# Patient Record
Sex: FEMALE | Race: WHITE | NOT HISPANIC OR LATINO | Employment: UNEMPLOYED | URBAN - METROPOLITAN AREA
[De-identification: names, ages, dates, MRNs, and addresses within clinical notes are randomized per-mention and may not be internally consistent; named-entity substitution may affect disease eponyms.]

---

## 2018-01-01 ENCOUNTER — LAB REQUISITION (OUTPATIENT)
Dept: LAB | Facility: HOSPITAL | Age: 83
End: 2018-01-01

## 2018-01-01 ENCOUNTER — APPOINTMENT (OUTPATIENT)
Dept: GENERAL RADIOLOGY | Facility: HOSPITAL | Age: 83
End: 2018-01-01
Attending: INTERNAL MEDICINE

## 2018-01-01 ENCOUNTER — HOSPITAL ENCOUNTER (INPATIENT)
Facility: HOSPITAL | Age: 83
LOS: 2 days | End: 2018-09-26
Attending: INTERNAL MEDICINE | Admitting: INTERNAL MEDICINE

## 2018-01-01 ENCOUNTER — APPOINTMENT (OUTPATIENT)
Dept: GENERAL RADIOLOGY | Facility: HOSPITAL | Age: 83
End: 2018-01-01

## 2018-01-01 ENCOUNTER — HOSPITAL ENCOUNTER (INPATIENT)
Facility: HOSPITAL | Age: 83
LOS: 8 days | Discharge: HOSPICE | End: 2018-09-24
Attending: EMERGENCY MEDICINE | Admitting: INTERNAL MEDICINE

## 2018-01-01 ENCOUNTER — APPOINTMENT (OUTPATIENT)
Dept: ULTRASOUND IMAGING | Facility: HOSPITAL | Age: 83
End: 2018-01-01

## 2018-01-01 VITALS
BODY MASS INDEX: 21.34 KG/M2 | HEART RATE: 82 BPM | SYSTOLIC BLOOD PRESSURE: 141 MMHG | HEIGHT: 64 IN | OXYGEN SATURATION: 91 % | RESPIRATION RATE: 18 BRPM | TEMPERATURE: 97.8 F | DIASTOLIC BLOOD PRESSURE: 82 MMHG | WEIGHT: 125 LBS

## 2018-01-01 VITALS — TEMPERATURE: 97.4 F | DIASTOLIC BLOOD PRESSURE: 66 MMHG | SYSTOLIC BLOOD PRESSURE: 109 MMHG | OXYGEN SATURATION: 65 %

## 2018-01-01 DIAGNOSIS — A41.9 SEPTIC SHOCK (HCC): ICD-10-CM

## 2018-01-01 DIAGNOSIS — Z00.00 ROUTINE GENERAL MEDICAL EXAMINATION AT A HEALTH CARE FACILITY: ICD-10-CM

## 2018-01-01 DIAGNOSIS — E87.0 HYPERNATREMIA: ICD-10-CM

## 2018-01-01 DIAGNOSIS — N17.9 ACUTE RENAL FAILURE, UNSPECIFIED ACUTE RENAL FAILURE TYPE (HCC): ICD-10-CM

## 2018-01-01 DIAGNOSIS — A41.9 SEPSIS, DUE TO UNSPECIFIED ORGANISM: Primary | ICD-10-CM

## 2018-01-01 DIAGNOSIS — D72.829 LEUKOCYTOSIS, UNSPECIFIED TYPE: ICD-10-CM

## 2018-01-01 DIAGNOSIS — E86.0 DEHYDRATION: ICD-10-CM

## 2018-01-01 DIAGNOSIS — R13.12 OROPHARYNGEAL DYSPHAGIA: ICD-10-CM

## 2018-01-01 DIAGNOSIS — R65.21 SEPTIC SHOCK (HCC): ICD-10-CM

## 2018-01-01 LAB
ALBUMIN SERPL-MCNC: 2.3 G/DL (ref 3.5–5.2)
ALBUMIN SERPL-MCNC: 2.5 G/DL (ref 3.5–5.2)
ALBUMIN SERPL-MCNC: 2.8 G/DL (ref 3.5–5.2)
ALBUMIN/GLOB SERPL: 0.7 G/DL
ALP SERPL-CCNC: 74 U/L (ref 39–117)
ALT SERPL W P-5'-P-CCNC: 22 U/L (ref 1–33)
ANION GAP SERPL CALCULATED.3IONS-SCNC: 12.8 MMOL/L
ANION GAP SERPL CALCULATED.3IONS-SCNC: 14.9 MMOL/L
ANION GAP SERPL CALCULATED.3IONS-SCNC: 17.5 MMOL/L
ANION GAP SERPL CALCULATED.3IONS-SCNC: 7.7 MMOL/L
ANION GAP SERPL CALCULATED.3IONS-SCNC: 9.9 MMOL/L
ANION GAP SERPL CALCULATED.3IONS-SCNC: 9.9 MMOL/L
APTT PPP: 38.8 SECONDS (ref 22.7–35.4)
AST SERPL-CCNC: 26 U/L (ref 1–32)
BACTERIA SPEC AEROBE CULT: NORMAL
BACTERIA SPEC AEROBE CULT: NORMAL
BACTERIA UR QL AUTO: ABNORMAL /HPF
BASOPHILS # BLD AUTO: 0.01 10*3/MM3 (ref 0–0.2)
BASOPHILS # BLD AUTO: 0.01 10*3/MM3 (ref 0–0.2)
BASOPHILS # BLD AUTO: 0.02 10*3/MM3 (ref 0–0.2)
BASOPHILS # BLD AUTO: 0.03 10*3/MM3 (ref 0–0.2)
BASOPHILS # BLD AUTO: 0.04 10*3/MM3 (ref 0–0.2)
BASOPHILS # BLD AUTO: 0.04 10*3/MM3 (ref 0–0.2)
BASOPHILS NFR BLD AUTO: 0.1 % (ref 0–1.5)
BASOPHILS NFR BLD AUTO: 0.2 % (ref 0–1.5)
BILIRUB SERPL-MCNC: 0.5 MG/DL (ref 0.1–1.2)
BILIRUB UR QL STRIP: NEGATIVE
BUN BLD-MCNC: 23 MG/DL (ref 8–23)
BUN BLD-MCNC: 35 MG/DL (ref 8–23)
BUN BLD-MCNC: 51 MG/DL (ref 8–23)
BUN BLD-MCNC: 70 MG/DL (ref 8–23)
BUN BLD-MCNC: 80 MG/DL (ref 8–23)
BUN BLD-MCNC: 85 MG/DL (ref 8–23)
BUN/CREAT SERPL: 22.3 (ref 7–25)
BUN/CREAT SERPL: 32.4 (ref 7–25)
BUN/CREAT SERPL: 32.7 (ref 7–25)
BUN/CREAT SERPL: 33.5 (ref 7–25)
BUN/CREAT SERPL: 36.5 (ref 7–25)
BUN/CREAT SERPL: 37.2 (ref 7–25)
CA-I BLD-MCNC: 4.2 MG/DL (ref 4.6–5.4)
CA-I SERPL ISE-MCNC: 1.04 MMOL/L (ref 1.15–1.35)
CALCIUM SPEC-SCNC: 6.7 MG/DL (ref 8.6–10.5)
CALCIUM SPEC-SCNC: 6.8 MG/DL (ref 8.6–10.5)
CALCIUM SPEC-SCNC: 6.8 MG/DL (ref 8.6–10.5)
CALCIUM SPEC-SCNC: 8.3 MG/DL (ref 8.6–10.5)
CALCIUM SPEC-SCNC: 8.4 MG/DL (ref 8.6–10.5)
CALCIUM SPEC-SCNC: 8.8 MG/DL (ref 8.6–10.5)
CHLORIDE SERPL-SCNC: 123 MMOL/L (ref 98–107)
CHLORIDE SERPL-SCNC: 125 MMOL/L (ref 98–107)
CHLORIDE SERPL-SCNC: 125 MMOL/L (ref 98–107)
CHLORIDE SERPL-SCNC: 129 MMOL/L (ref 98–107)
CHLORIDE SERPL-SCNC: 131 MMOL/L (ref 98–107)
CHLORIDE SERPL-SCNC: 135 MMOL/L (ref 98–107)
CLARITY UR: ABNORMAL
CO2 SERPL-SCNC: 21.1 MMOL/L (ref 22–29)
CO2 SERPL-SCNC: 22.1 MMOL/L (ref 22–29)
CO2 SERPL-SCNC: 22.2 MMOL/L (ref 22–29)
CO2 SERPL-SCNC: 22.3 MMOL/L (ref 22–29)
CO2 SERPL-SCNC: 28.1 MMOL/L (ref 22–29)
CO2 SERPL-SCNC: 30.5 MMOL/L (ref 22–29)
COLOR UR: ABNORMAL
CREAT BLD-MCNC: 0.96 MG/DL (ref 0.57–1)
CREAT BLD-MCNC: 1.03 MG/DL (ref 0.57–1)
CREAT BLD-MCNC: 1.37 MG/DL (ref 0.57–1)
CREAT BLD-MCNC: 2.09 MG/DL (ref 0.57–1)
CREAT BLD-MCNC: 2.47 MG/DL (ref 0.57–1)
CREAT BLD-MCNC: 2.6 MG/DL (ref 0.57–1)
D-LACTATE SERPL-SCNC: 2 MMOL/L (ref 0.5–2)
DEPRECATED RDW RBC AUTO: 49.2 FL (ref 37–54)
DEPRECATED RDW RBC AUTO: 50.4 FL (ref 37–54)
DEPRECATED RDW RBC AUTO: 53.8 FL (ref 37–54)
DEPRECATED RDW RBC AUTO: 54.9 FL (ref 37–54)
DEPRECATED RDW RBC AUTO: 55.2 FL (ref 37–54)
DEPRECATED RDW RBC AUTO: 56.3 FL (ref 37–54)
EOSINOPHIL # BLD AUTO: 0 10*3/MM3 (ref 0–0.7)
EOSINOPHIL # BLD AUTO: 0.01 10*3/MM3 (ref 0–0.7)
EOSINOPHIL # BLD AUTO: 0.01 10*3/MM3 (ref 0–0.7)
EOSINOPHIL # BLD AUTO: 0.02 10*3/MM3 (ref 0–0.7)
EOSINOPHIL # BLD AUTO: 0.02 10*3/MM3 (ref 0–0.7)
EOSINOPHIL # BLD AUTO: 0.06 10*3/MM3 (ref 0–0.7)
EOSINOPHIL NFR BLD AUTO: 0 % (ref 0.3–6.2)
EOSINOPHIL NFR BLD AUTO: 0.1 % (ref 0.3–6.2)
EOSINOPHIL NFR BLD AUTO: 0.3 % (ref 0.3–6.2)
ERYTHROCYTE [DISTWIDTH] IN BLOOD BY AUTOMATED COUNT: 13.5 % (ref 11.7–13)
ERYTHROCYTE [DISTWIDTH] IN BLOOD BY AUTOMATED COUNT: 13.7 % (ref 11.7–13)
ERYTHROCYTE [DISTWIDTH] IN BLOOD BY AUTOMATED COUNT: 13.9 % (ref 11.7–13)
ERYTHROCYTE [DISTWIDTH] IN BLOOD BY AUTOMATED COUNT: 14.2 % (ref 11.7–13)
ERYTHROCYTE [DISTWIDTH] IN BLOOD BY AUTOMATED COUNT: 14.3 % (ref 11.7–13)
ERYTHROCYTE [DISTWIDTH] IN BLOOD BY AUTOMATED COUNT: 14.4 % (ref 11.7–13)
FINE GRAN CASTS URNS QL MICRO: ABNORMAL /LPF
GFR SERPL CREATININE-BSD FRML MDRD: 17 ML/MIN/1.73
GFR SERPL CREATININE-BSD FRML MDRD: 18 ML/MIN/1.73
GFR SERPL CREATININE-BSD FRML MDRD: 22 ML/MIN/1.73
GFR SERPL CREATININE-BSD FRML MDRD: 22 ML/MIN/1.73
GFR SERPL CREATININE-BSD FRML MDRD: 36 ML/MIN/1.73
GFR SERPL CREATININE-BSD FRML MDRD: 50 ML/MIN/1.73
GFR SERPL CREATININE-BSD FRML MDRD: 55 ML/MIN/1.73
GLOBULIN UR ELPH-MCNC: 4 GM/DL
GLUCOSE BLD-MCNC: 132 MG/DL (ref 65–99)
GLUCOSE BLD-MCNC: 155 MG/DL (ref 65–99)
GLUCOSE BLD-MCNC: 237 MG/DL (ref 65–99)
GLUCOSE BLD-MCNC: 73 MG/DL (ref 65–99)
GLUCOSE BLD-MCNC: 84 MG/DL (ref 65–99)
GLUCOSE BLD-MCNC: 93 MG/DL (ref 65–99)
GLUCOSE BLDC GLUCOMTR-MCNC: 103 MG/DL (ref 70–130)
GLUCOSE BLDC GLUCOMTR-MCNC: 108 MG/DL (ref 70–130)
GLUCOSE BLDC GLUCOMTR-MCNC: 109 MG/DL (ref 70–130)
GLUCOSE BLDC GLUCOMTR-MCNC: 122 MG/DL (ref 70–130)
GLUCOSE BLDC GLUCOMTR-MCNC: 130 MG/DL (ref 70–130)
GLUCOSE BLDC GLUCOMTR-MCNC: 136 MG/DL (ref 70–130)
GLUCOSE BLDC GLUCOMTR-MCNC: 137 MG/DL (ref 70–130)
GLUCOSE BLDC GLUCOMTR-MCNC: 141 MG/DL (ref 70–130)
GLUCOSE BLDC GLUCOMTR-MCNC: 143 MG/DL (ref 70–130)
GLUCOSE BLDC GLUCOMTR-MCNC: 145 MG/DL (ref 70–130)
GLUCOSE BLDC GLUCOMTR-MCNC: 156 MG/DL (ref 70–130)
GLUCOSE BLDC GLUCOMTR-MCNC: 159 MG/DL (ref 70–130)
GLUCOSE BLDC GLUCOMTR-MCNC: 192 MG/DL (ref 70–130)
GLUCOSE BLDC GLUCOMTR-MCNC: 200 MG/DL (ref 70–130)
GLUCOSE BLDC GLUCOMTR-MCNC: 223 MG/DL (ref 70–130)
GLUCOSE BLDC GLUCOMTR-MCNC: 224 MG/DL (ref 70–130)
GLUCOSE BLDC GLUCOMTR-MCNC: 82 MG/DL (ref 70–130)
GLUCOSE BLDC GLUCOMTR-MCNC: 90 MG/DL (ref 70–130)
GLUCOSE BLDC GLUCOMTR-MCNC: 92 MG/DL (ref 70–130)
GLUCOSE UR STRIP-MCNC: NEGATIVE MG/DL
HCT VFR BLD AUTO: 35.4 % (ref 35.6–45.5)
HCT VFR BLD AUTO: 36.2 % (ref 35.6–45.5)
HCT VFR BLD AUTO: 37 % (ref 35.6–45.5)
HCT VFR BLD AUTO: 40.9 % (ref 35.6–45.5)
HCT VFR BLD AUTO: 42.5 % (ref 35.6–45.5)
HCT VFR BLD AUTO: 46.5 % (ref 35.6–45.5)
HGB BLD-MCNC: 10.3 G/DL (ref 11.9–15.5)
HGB BLD-MCNC: 10.7 G/DL (ref 11.9–15.5)
HGB BLD-MCNC: 11.1 G/DL (ref 11.9–15.5)
HGB BLD-MCNC: 11.3 G/DL (ref 11.9–15.5)
HGB BLD-MCNC: 12.6 G/DL (ref 11.9–15.5)
HGB BLD-MCNC: 13.3 G/DL (ref 11.9–15.5)
HGB UR QL STRIP.AUTO: ABNORMAL
HOLD SPECIMEN: NORMAL
HOLD SPECIMEN: NORMAL
HYALINE CASTS UR QL AUTO: ABNORMAL /LPF
IMM GRANULOCYTES # BLD: 0.03 10*3/MM3 (ref 0–0.03)
IMM GRANULOCYTES # BLD: 0.04 10*3/MM3 (ref 0–0.03)
IMM GRANULOCYTES # BLD: 0.05 10*3/MM3 (ref 0–0.03)
IMM GRANULOCYTES # BLD: 0.05 10*3/MM3 (ref 0–0.03)
IMM GRANULOCYTES # BLD: 0.06 10*3/MM3 (ref 0–0.03)
IMM GRANULOCYTES # BLD: 0.09 10*3/MM3 (ref 0–0.03)
IMM GRANULOCYTES NFR BLD: 0.3 % (ref 0–0.5)
IMM GRANULOCYTES NFR BLD: 0.5 % (ref 0–0.5)
INR PPP: 1.86 (ref 0.9–1.1)
KETONES UR QL STRIP: ABNORMAL
LEUKOCYTE ESTERASE UR QL STRIP.AUTO: ABNORMAL
LYMPHOCYTES # BLD AUTO: 0.46 10*3/MM3 (ref 0.9–4.8)
LYMPHOCYTES # BLD AUTO: 0.54 10*3/MM3 (ref 0.9–4.8)
LYMPHOCYTES # BLD AUTO: 1.23 10*3/MM3 (ref 0.9–4.8)
LYMPHOCYTES # BLD AUTO: 1.27 10*3/MM3 (ref 0.9–4.8)
LYMPHOCYTES # BLD AUTO: 1.89 10*3/MM3 (ref 0.9–4.8)
LYMPHOCYTES # BLD AUTO: 2.25 10*3/MM3 (ref 0.9–4.8)
LYMPHOCYTES NFR BLD AUTO: 11.4 % (ref 19.6–45.3)
LYMPHOCYTES NFR BLD AUTO: 4.3 % (ref 19.6–45.3)
LYMPHOCYTES NFR BLD AUTO: 4.6 % (ref 19.6–45.3)
LYMPHOCYTES NFR BLD AUTO: 6.6 % (ref 19.6–45.3)
LYMPHOCYTES NFR BLD AUTO: 6.8 % (ref 19.6–45.3)
LYMPHOCYTES NFR BLD AUTO: 9.7 % (ref 19.6–45.3)
MAGNESIUM SERPL-MCNC: 2.3 MG/DL (ref 1.6–2.4)
MAGNESIUM SERPL-MCNC: 2.3 MG/DL (ref 1.6–2.4)
MCH RBC QN AUTO: 30 PG (ref 26.9–32)
MCH RBC QN AUTO: 30.1 PG (ref 26.9–32)
MCH RBC QN AUTO: 30.3 PG (ref 26.9–32)
MCH RBC QN AUTO: 30.6 PG (ref 26.9–32)
MCH RBC QN AUTO: 30.9 PG (ref 26.9–32)
MCH RBC QN AUTO: 31.5 PG (ref 26.9–32)
MCHC RBC AUTO-ENTMCNC: 27.6 G/DL (ref 32.4–36.3)
MCHC RBC AUTO-ENTMCNC: 28.6 G/DL (ref 32.4–36.3)
MCHC RBC AUTO-ENTMCNC: 29.1 G/DL (ref 32.4–36.3)
MCHC RBC AUTO-ENTMCNC: 29.6 G/DL (ref 32.4–36.3)
MCHC RBC AUTO-ENTMCNC: 29.6 G/DL (ref 32.4–36.3)
MCHC RBC AUTO-ENTMCNC: 30 G/DL (ref 32.4–36.3)
MCV RBC AUTO: 101.1 FL (ref 80.5–98.2)
MCV RBC AUTO: 101.7 FL (ref 80.5–98.2)
MCV RBC AUTO: 106.3 FL (ref 80.5–98.2)
MCV RBC AUTO: 106.3 FL (ref 80.5–98.2)
MCV RBC AUTO: 107.1 FL (ref 80.5–98.2)
MCV RBC AUTO: 108.5 FL (ref 80.5–98.2)
MONOCYTES # BLD AUTO: 0.22 10*3/MM3 (ref 0.2–1.2)
MONOCYTES # BLD AUTO: 0.31 10*3/MM3 (ref 0.2–1.2)
MONOCYTES # BLD AUTO: 0.83 10*3/MM3 (ref 0.2–1.2)
MONOCYTES # BLD AUTO: 1.01 10*3/MM3 (ref 0.2–1.2)
MONOCYTES # BLD AUTO: 1.28 10*3/MM3 (ref 0.2–1.2)
MONOCYTES # BLD AUTO: 1.42 10*3/MM3 (ref 0.2–1.2)
MONOCYTES NFR BLD AUTO: 2.1 % (ref 5–12)
MONOCYTES NFR BLD AUTO: 2.6 % (ref 5–12)
MONOCYTES NFR BLD AUTO: 4.3 % (ref 5–12)
MONOCYTES NFR BLD AUTO: 5.6 % (ref 5–12)
MONOCYTES NFR BLD AUTO: 6.6 % (ref 5–12)
MONOCYTES NFR BLD AUTO: 7.2 % (ref 5–12)
MRSA SPEC QL CULT: NORMAL
NEUTROPHILS # BLD AUTO: 10 10*3/MM3 (ref 1.9–8.1)
NEUTROPHILS # BLD AUTO: 10.93 10*3/MM3 (ref 1.9–8.1)
NEUTROPHILS # BLD AUTO: 15.75 10*3/MM3 (ref 1.9–8.1)
NEUTROPHILS # BLD AUTO: 16.08 10*3/MM3 (ref 1.9–8.1)
NEUTROPHILS # BLD AUTO: 16.14 10*3/MM3 (ref 1.9–8.1)
NEUTROPHILS # BLD AUTO: 17.13 10*3/MM3 (ref 1.9–8.1)
NEUTROPHILS NFR BLD AUTO: 81.1 % (ref 42.7–76)
NEUTROPHILS NFR BLD AUTO: 83.1 % (ref 42.7–76)
NEUTROPHILS NFR BLD AUTO: 87.2 % (ref 42.7–76)
NEUTROPHILS NFR BLD AUTO: 88.5 % (ref 42.7–76)
NEUTROPHILS NFR BLD AUTO: 92.4 % (ref 42.7–76)
NEUTROPHILS NFR BLD AUTO: 93.1 % (ref 42.7–76)
NITRITE UR QL STRIP: NEGATIVE
NRBC BLD MANUAL-RTO: 0 /100 WBC (ref 0–0)
NT-PROBNP SERPL-MCNC: 4442 PG/ML (ref 0–1800)
OSMOLALITY UR: 603 MOSM/KG
PH UR STRIP.AUTO: <=5 [PH] (ref 5–8)
PHOSPHATE SERPL-MCNC: 1.9 MG/DL (ref 2.5–4.5)
PHOSPHATE SERPL-MCNC: 2.4 MG/DL (ref 2.5–4.5)
PHOSPHATE SERPL-MCNC: 3.1 MG/DL (ref 2.5–4.5)
PLAT MORPH BLD: NORMAL
PLATELET # BLD AUTO: 103 10*3/MM3 (ref 140–500)
PLATELET # BLD AUTO: 127 10*3/MM3 (ref 140–500)
PLATELET # BLD AUTO: 129 10*3/MM3 (ref 140–500)
PLATELET # BLD AUTO: 142 10*3/MM3 (ref 140–500)
PLATELET # BLD AUTO: 142 10*3/MM3 (ref 140–500)
PLATELET # BLD AUTO: 163 10*3/MM3 (ref 140–500)
PMV BLD AUTO: 13.6 FL (ref 6–12)
PMV BLD AUTO: 13.7 FL (ref 6–12)
PMV BLD AUTO: 13.8 FL (ref 6–12)
POTASSIUM BLD-SCNC: 3.3 MMOL/L (ref 3.5–5.2)
POTASSIUM BLD-SCNC: 3.4 MMOL/L (ref 3.5–5.2)
POTASSIUM BLD-SCNC: 3.4 MMOL/L (ref 3.5–5.2)
POTASSIUM BLD-SCNC: 4 MMOL/L (ref 3.5–5.2)
POTASSIUM BLD-SCNC: 4.5 MMOL/L (ref 3.5–5.2)
POTASSIUM BLD-SCNC: 4.8 MMOL/L (ref 3.5–5.2)
PROCALCITONIN SERPL-MCNC: 0.71 NG/ML (ref 0.1–0.25)
PROT SERPL-MCNC: 6.8 G/DL (ref 6–8.5)
PROT UR QL STRIP: ABNORMAL
PROTHROMBIN TIME: 21.1 SECONDS (ref 11.7–14.2)
RBC # BLD AUTO: 3.33 10*6/MM3 (ref 3.9–5.2)
RBC # BLD AUTO: 3.56 10*6/MM3 (ref 3.9–5.2)
RBC # BLD AUTO: 3.66 10*6/MM3 (ref 3.9–5.2)
RBC # BLD AUTO: 3.77 10*6/MM3 (ref 3.9–5.2)
RBC # BLD AUTO: 4 10*6/MM3 (ref 3.9–5.2)
RBC # BLD AUTO: 4.34 10*6/MM3 (ref 3.9–5.2)
RBC # UR: ABNORMAL /HPF
RBC MORPH BLD: NORMAL
REF LAB TEST METHOD: ABNORMAL
SODIUM BLD-SCNC: 154 MMOL/L (ref 136–145)
SODIUM BLD-SCNC: 155 MMOL/L (ref 136–145)
SODIUM BLD-SCNC: 156 MMOL/L (ref 136–145)
SODIUM BLD-SCNC: 161 MMOL/L (ref 136–145)
SODIUM BLD-SCNC: 163 MMOL/L (ref 136–145)
SODIUM BLD-SCNC: 167 MMOL/L (ref 136–145)
SODIUM BLD-SCNC: 170 MMOL/L (ref 136–145)
SODIUM BLD-SCNC: 170 MMOL/L (ref 136–145)
SODIUM BLD-SCNC: 172 MMOL/L (ref 136–145)
SODIUM BLD-SCNC: 173 MMOL/L (ref 136–145)
SP GR UR STRIP: 1.02 (ref 1–1.03)
SQUAMOUS #/AREA URNS HPF: ABNORMAL /HPF
TROPONIN T SERPL-MCNC: 0.1 NG/ML (ref 0–0.03)
TROPONIN T SERPL-MCNC: 0.11 NG/ML (ref 0–0.03)
TROPONIN T SERPL-MCNC: 0.11 NG/ML (ref 0–0.03)
TROPONIN T SERPL-MCNC: 0.12 NG/ML (ref 0–0.03)
UROBILINOGEN UR QL STRIP: ABNORMAL
VANCOMYCIN SERPL-MCNC: 16.6 MCG/ML (ref 5–40)
VANCOMYCIN SERPL-MCNC: 17.1 MCG/ML (ref 5–40)
VANCOMYCIN SERPL-MCNC: 21.3 MCG/ML (ref 5–40)
VANCOMYCIN SERPL-MCNC: 23 MCG/ML (ref 5–40)
VANCOMYCIN TROUGH SERPL-MCNC: 12.6 MCG/ML (ref 5–20)
WBC MORPH BLD: NORMAL
WBC NRBC COR # BLD: 10.73 10*3/MM3 (ref 4.5–10.7)
WBC NRBC COR # BLD: 11.83 10*3/MM3 (ref 4.5–10.7)
WBC NRBC COR # BLD: 18.07 10*3/MM3 (ref 4.5–10.7)
WBC NRBC COR # BLD: 19.33 10*3/MM3 (ref 4.5–10.7)
WBC NRBC COR # BLD: 19.42 10*3/MM3 (ref 4.5–10.7)
WBC NRBC COR # BLD: 19.8 10*3/MM3 (ref 4.5–10.7)
WBC UR QL AUTO: ABNORMAL /HPF
WHOLE BLOOD HOLD SPECIMEN: NORMAL
WHOLE BLOOD HOLD SPECIMEN: NORMAL

## 2018-01-01 PROCEDURE — 25010000002 CEFEPIME PER 500 MG: Performed by: INTERNAL MEDICINE

## 2018-01-01 PROCEDURE — 82962 GLUCOSE BLOOD TEST: CPT

## 2018-01-01 PROCEDURE — 25010000002 MORPHINE PER 10 MG: Performed by: INTERNAL MEDICINE

## 2018-01-01 PROCEDURE — 85025 COMPLETE CBC W/AUTO DIFF WBC: CPT

## 2018-01-01 PROCEDURE — 25010000002 HYDROCORTISONE SODIUM SUCCINATE 100 MG RECONSTITUTED SOLUTION: Performed by: INTERNAL MEDICINE

## 2018-01-01 PROCEDURE — 85025 COMPLETE CBC W/AUTO DIFF WBC: CPT | Performed by: INTERNAL MEDICINE

## 2018-01-01 PROCEDURE — 83605 ASSAY OF LACTIC ACID: CPT | Performed by: EMERGENCY MEDICINE

## 2018-01-01 PROCEDURE — 85007 BL SMEAR W/DIFF WBC COUNT: CPT | Performed by: EMERGENCY MEDICINE

## 2018-01-01 PROCEDURE — 80202 ASSAY OF VANCOMYCIN: CPT | Performed by: INTERNAL MEDICINE

## 2018-01-01 PROCEDURE — 83935 ASSAY OF URINE OSMOLALITY: CPT | Performed by: INTERNAL MEDICINE

## 2018-01-01 PROCEDURE — 87081 CULTURE SCREEN ONLY: CPT | Performed by: INTERNAL MEDICINE

## 2018-01-01 PROCEDURE — 76700 US EXAM ABDOM COMPLETE: CPT

## 2018-01-01 PROCEDURE — 25010000002 LORAZEPAM PER 2 MG: Performed by: INTERNAL MEDICINE

## 2018-01-01 PROCEDURE — 84484 ASSAY OF TROPONIN QUANT: CPT | Performed by: INTERNAL MEDICINE

## 2018-01-01 PROCEDURE — 85025 COMPLETE CBC W/AUTO DIFF WBC: CPT | Performed by: EMERGENCY MEDICINE

## 2018-01-01 PROCEDURE — 80069 RENAL FUNCTION PANEL: CPT | Performed by: INTERNAL MEDICINE

## 2018-01-01 PROCEDURE — 94640 AIRWAY INHALATION TREATMENT: CPT

## 2018-01-01 PROCEDURE — 85007 BL SMEAR W/DIFF WBC COUNT: CPT

## 2018-01-01 PROCEDURE — 84295 ASSAY OF SERUM SODIUM: CPT | Performed by: INTERNAL MEDICINE

## 2018-01-01 PROCEDURE — 74018 RADEX ABDOMEN 1 VIEW: CPT

## 2018-01-01 PROCEDURE — 87040 BLOOD CULTURE FOR BACTERIA: CPT | Performed by: EMERGENCY MEDICINE

## 2018-01-01 PROCEDURE — 84100 ASSAY OF PHOSPHORUS: CPT | Performed by: INTERNAL MEDICINE

## 2018-01-01 PROCEDURE — 84145 PROCALCITONIN (PCT): CPT | Performed by: EMERGENCY MEDICINE

## 2018-01-01 PROCEDURE — 94799 UNLISTED PULMONARY SVC/PX: CPT

## 2018-01-01 PROCEDURE — 25010000002 VANCOMYCIN PER 500 MG: Performed by: EMERGENCY MEDICINE

## 2018-01-01 PROCEDURE — 85610 PROTHROMBIN TIME: CPT | Performed by: EMERGENCY MEDICINE

## 2018-01-01 PROCEDURE — 25010000003 POTASSIUM CHLORIDE 10 MEQ/100ML SOLUTION: Performed by: INTERNAL MEDICINE

## 2018-01-01 PROCEDURE — 25010000002 VANCOMYCIN PER 500 MG: Performed by: INTERNAL MEDICINE

## 2018-01-01 PROCEDURE — 82330 ASSAY OF CALCIUM: CPT | Performed by: INTERNAL MEDICINE

## 2018-01-01 PROCEDURE — 84484 ASSAY OF TROPONIN QUANT: CPT | Performed by: EMERGENCY MEDICINE

## 2018-01-01 PROCEDURE — 71045 X-RAY EXAM CHEST 1 VIEW: CPT

## 2018-01-01 PROCEDURE — 25010000002 CALCIUM GLUCONATE PER 10 ML: Performed by: INTERNAL MEDICINE

## 2018-01-01 PROCEDURE — 80048 BASIC METABOLIC PNL TOTAL CA: CPT | Performed by: INTERNAL MEDICINE

## 2018-01-01 PROCEDURE — 93005 ELECTROCARDIOGRAM TRACING: CPT | Performed by: INTERNAL MEDICINE

## 2018-01-01 PROCEDURE — 83880 ASSAY OF NATRIURETIC PEPTIDE: CPT | Performed by: EMERGENCY MEDICINE

## 2018-01-01 PROCEDURE — 25010000002 HYDROMORPHONE PER 4 MG: Performed by: INTERNAL MEDICINE

## 2018-01-01 PROCEDURE — 99291 CRITICAL CARE FIRST HOUR: CPT

## 2018-01-01 PROCEDURE — 93005 ELECTROCARDIOGRAM TRACING: CPT | Performed by: EMERGENCY MEDICINE

## 2018-01-01 PROCEDURE — 81001 URINALYSIS AUTO W/SCOPE: CPT | Performed by: EMERGENCY MEDICINE

## 2018-01-01 PROCEDURE — 92610 EVALUATE SWALLOWING FUNCTION: CPT

## 2018-01-01 PROCEDURE — 25010000002 CEFEPIME PER 500 MG: Performed by: EMERGENCY MEDICINE

## 2018-01-01 PROCEDURE — 83735 ASSAY OF MAGNESIUM: CPT | Performed by: INTERNAL MEDICINE

## 2018-01-01 PROCEDURE — 80048 BASIC METABOLIC PNL TOTAL CA: CPT

## 2018-01-01 PROCEDURE — 85730 THROMBOPLASTIN TIME PARTIAL: CPT | Performed by: EMERGENCY MEDICINE

## 2018-01-01 PROCEDURE — 93010 ELECTROCARDIOGRAM REPORT: CPT | Performed by: INTERNAL MEDICINE

## 2018-01-01 PROCEDURE — 80053 COMPREHEN METABOLIC PANEL: CPT | Performed by: EMERGENCY MEDICINE

## 2018-01-01 PROCEDURE — 25010000002 CEFEPIME 2 G/NS 100 ML SOLUTION: Performed by: INTERNAL MEDICINE

## 2018-01-01 RX ORDER — POTASSIUM CHLORIDE 7.45 MG/ML
10 INJECTION INTRAVENOUS
Status: DISCONTINUED | OUTPATIENT
Start: 2018-01-01 | End: 2018-01-01

## 2018-01-01 RX ORDER — LORAZEPAM 2 MG/ML
2 INJECTION INTRAMUSCULAR
Status: DISCONTINUED | OUTPATIENT
Start: 2018-01-01 | End: 2018-01-01 | Stop reason: HOSPADM

## 2018-01-01 RX ORDER — DIPHENOXYLATE HYDROCHLORIDE AND ATROPINE SULFATE 2.5; .025 MG/1; MG/1
1 TABLET ORAL
Status: CANCELLED | OUTPATIENT
Start: 2018-01-01

## 2018-01-01 RX ORDER — GLYCOPYRROLATE 0.2 MG/ML
0.2 INJECTION INTRAMUSCULAR; INTRAVENOUS
Status: DISCONTINUED | OUTPATIENT
Start: 2018-01-01 | End: 2018-01-01 | Stop reason: HOSPADM

## 2018-01-01 RX ORDER — FAMOTIDINE 20 MG/1
20 TABLET, FILM COATED ORAL 2 TIMES DAILY
COMMUNITY

## 2018-01-01 RX ORDER — PROMETHAZINE HYDROCHLORIDE 25 MG/ML
6.25 INJECTION, SOLUTION INTRAMUSCULAR; INTRAVENOUS EVERY 4 HOURS PRN
Status: CANCELLED | OUTPATIENT
Start: 2018-01-01

## 2018-01-01 RX ORDER — SODIUM CHLORIDE 0.9 % (FLUSH) 0.9 %
10 SYRINGE (ML) INJECTION AS NEEDED
Status: DISCONTINUED | OUTPATIENT
Start: 2018-01-01 | End: 2018-01-01

## 2018-01-01 RX ORDER — LORAZEPAM 2 MG/ML
2 CONCENTRATE ORAL
Status: DISCONTINUED | OUTPATIENT
Start: 2018-01-01 | End: 2018-01-01 | Stop reason: HOSPADM

## 2018-01-01 RX ORDER — MORPHINE SULFATE 20 MG/ML
5 SOLUTION ORAL
Status: CANCELLED | OUTPATIENT
Start: 2018-01-01 | End: 2018-09-29

## 2018-01-01 RX ORDER — LORAZEPAM 2 MG/ML
2 CONCENTRATE ORAL
Status: CANCELLED | OUTPATIENT
Start: 2018-01-01 | End: 2018-09-29

## 2018-01-01 RX ORDER — MIRTAZAPINE 15 MG/1
7.5 TABLET, FILM COATED ORAL NIGHTLY
COMMUNITY

## 2018-01-01 RX ORDER — FUROSEMIDE 20 MG/1
20 TABLET ORAL DAILY
COMMUNITY

## 2018-01-01 RX ORDER — GLYCOPYRROLATE 0.2 MG/ML
0.4 INJECTION INTRAMUSCULAR; INTRAVENOUS
Status: DISCONTINUED | OUTPATIENT
Start: 2018-01-01 | End: 2018-01-01 | Stop reason: HOSPADM

## 2018-01-01 RX ORDER — ACETAMINOPHEN 650 MG/1
650 SUPPOSITORY RECTAL EVERY 4 HOURS PRN
Status: DISCONTINUED | OUTPATIENT
Start: 2018-01-01 | End: 2018-01-01 | Stop reason: HOSPADM

## 2018-01-01 RX ORDER — PROMETHAZINE HYDROCHLORIDE 6.25 MG/5ML
6.25 SYRUP ORAL EVERY 4 HOURS PRN
Status: DISCONTINUED | OUTPATIENT
Start: 2018-01-01 | End: 2018-01-01 | Stop reason: HOSPADM

## 2018-01-01 RX ORDER — SODIUM CHLORIDE 0.9 % (FLUSH) 0.9 %
10 SYRINGE (ML) INJECTION AS NEEDED
Status: CANCELLED | OUTPATIENT
Start: 2018-01-01

## 2018-01-01 RX ORDER — MORPHINE SULFATE 20 MG/ML
10 SOLUTION ORAL
Status: DISCONTINUED | OUTPATIENT
Start: 2018-01-01 | End: 2018-01-01 | Stop reason: HOSPADM

## 2018-01-01 RX ORDER — LORAZEPAM 2 MG/ML
1 CONCENTRATE ORAL
Status: CANCELLED | OUTPATIENT
Start: 2018-01-01 | End: 2018-09-29

## 2018-01-01 RX ORDER — SODIUM CHLORIDE 9 MG/ML
125 INJECTION, SOLUTION INTRAVENOUS CONTINUOUS
Status: DISCONTINUED | OUTPATIENT
Start: 2018-01-01 | End: 2018-01-01

## 2018-01-01 RX ORDER — MORPHINE SULFATE 20 MG/ML
10 SOLUTION ORAL
Status: CANCELLED | OUTPATIENT
Start: 2018-01-01 | End: 2018-09-29

## 2018-01-01 RX ORDER — LORAZEPAM 2 MG/ML
0.5 CONCENTRATE ORAL
Status: DISCONTINUED | OUTPATIENT
Start: 2018-01-01 | End: 2018-01-01 | Stop reason: HOSPADM

## 2018-01-01 RX ORDER — GLYCOPYRROLATE 0.2 MG/ML
0.2 INJECTION INTRAMUSCULAR; INTRAVENOUS
Status: CANCELLED | OUTPATIENT
Start: 2018-01-01

## 2018-01-01 RX ORDER — ACETAMINOPHEN 500 MG
1000 TABLET ORAL EVERY 6 HOURS PRN
COMMUNITY

## 2018-01-01 RX ORDER — PROMETHAZINE HYDROCHLORIDE 25 MG/ML
6.25 INJECTION, SOLUTION INTRAMUSCULAR; INTRAVENOUS EVERY 4 HOURS PRN
Status: DISCONTINUED | OUTPATIENT
Start: 2018-01-01 | End: 2018-01-01 | Stop reason: HOSPADM

## 2018-01-01 RX ORDER — POTASSIUM CHLORIDE 750 MG/1
40 CAPSULE, EXTENDED RELEASE ORAL AS NEEDED
Status: DISCONTINUED | OUTPATIENT
Start: 2018-01-01 | End: 2018-01-01

## 2018-01-01 RX ORDER — SENNA AND DOCUSATE SODIUM 50; 8.6 MG/1; MG/1
1 TABLET, FILM COATED ORAL DAILY
COMMUNITY

## 2018-01-01 RX ORDER — PROMETHAZINE HYDROCHLORIDE 12.5 MG/1
6.25 SUPPOSITORY RECTAL EVERY 4 HOURS PRN
Status: DISCONTINUED | OUTPATIENT
Start: 2018-01-01 | End: 2018-01-01 | Stop reason: HOSPADM

## 2018-01-01 RX ORDER — VALSARTAN 80 MG/1
80 TABLET ORAL DAILY
COMMUNITY

## 2018-01-01 RX ORDER — ACETAMINOPHEN 650 MG/1
650 SUPPOSITORY RECTAL ONCE
Status: DISCONTINUED | OUTPATIENT
Start: 2018-01-01 | End: 2018-01-01

## 2018-01-01 RX ORDER — LORAZEPAM 2 MG/ML
0.5 INJECTION INTRAMUSCULAR
Status: CANCELLED | OUTPATIENT
Start: 2018-01-01 | End: 2018-09-29

## 2018-01-01 RX ORDER — PROMETHAZINE HYDROCHLORIDE 6.25 MG/5ML
6.25 SYRUP ORAL EVERY 4 HOURS PRN
Status: CANCELLED | OUTPATIENT
Start: 2018-01-01

## 2018-01-01 RX ORDER — ACETAMINOPHEN 160 MG/5ML
650 SOLUTION ORAL EVERY 4 HOURS PRN
Status: DISCONTINUED | OUTPATIENT
Start: 2018-01-01 | End: 2018-01-01 | Stop reason: HOSPADM

## 2018-01-01 RX ORDER — IPRATROPIUM BROMIDE AND ALBUTEROL SULFATE 2.5; .5 MG/3ML; MG/3ML
3 SOLUTION RESPIRATORY (INHALATION) EVERY 4 HOURS PRN
Status: DISCONTINUED | OUTPATIENT
Start: 2018-01-01 | End: 2018-01-01 | Stop reason: HOSPADM

## 2018-01-01 RX ORDER — NOREPINEPHRINE BIT/0.9 % NACL 8 MG/250ML
.02-.3 INFUSION BOTTLE (ML) INTRAVENOUS
Status: DISCONTINUED | OUTPATIENT
Start: 2018-01-01 | End: 2018-01-01

## 2018-01-01 RX ORDER — MORPHINE SULFATE 2 MG/ML
2 INJECTION, SOLUTION INTRAMUSCULAR; INTRAVENOUS
Status: DISCONTINUED | OUTPATIENT
Start: 2018-01-01 | End: 2018-01-01 | Stop reason: CLARIF

## 2018-01-01 RX ORDER — LORAZEPAM 2 MG/ML
1 CONCENTRATE ORAL
Status: DISCONTINUED | OUTPATIENT
Start: 2018-01-01 | End: 2018-01-01 | Stop reason: HOSPADM

## 2018-01-01 RX ORDER — MORPHINE SULFATE 10 MG/ML
4 INJECTION INTRAMUSCULAR; INTRAVENOUS; SUBCUTANEOUS
Status: DISCONTINUED | OUTPATIENT
Start: 2018-01-01 | End: 2018-01-01 | Stop reason: CLARIF

## 2018-01-01 RX ORDER — SODIUM CHLORIDE 0.9 % (FLUSH) 0.9 %
10 SYRINGE (ML) INJECTION AS NEEDED
Status: DISCONTINUED | OUTPATIENT
Start: 2018-01-01 | End: 2018-01-01 | Stop reason: HOSPADM

## 2018-01-01 RX ORDER — CALCIUM GLUCONATE 94 MG/ML
2 INJECTION, SOLUTION INTRAVENOUS EVERY 4 HOURS
Status: DISCONTINUED | OUTPATIENT
Start: 2018-01-01 | End: 2018-01-01 | Stop reason: CLARIF

## 2018-01-01 RX ORDER — METOPROLOL TARTRATE 50 MG/1
50 TABLET, FILM COATED ORAL 2 TIMES DAILY
COMMUNITY

## 2018-01-01 RX ORDER — LORAZEPAM 2 MG/ML
2 INJECTION INTRAMUSCULAR
Status: CANCELLED | OUTPATIENT
Start: 2018-01-01 | End: 2018-09-29

## 2018-01-01 RX ORDER — DEXTROSE MONOHYDRATE 50 MG/ML
200 INJECTION, SOLUTION INTRAVENOUS CONTINUOUS
Status: DISCONTINUED | OUTPATIENT
Start: 2018-01-01 | End: 2018-01-01

## 2018-01-01 RX ORDER — ACETAMINOPHEN 325 MG/1
650 TABLET ORAL EVERY 4 HOURS PRN
Status: DISCONTINUED | OUTPATIENT
Start: 2018-01-01 | End: 2018-01-01 | Stop reason: HOSPADM

## 2018-01-01 RX ORDER — ACETAMINOPHEN 650 MG/1
650 SUPPOSITORY RECTAL EVERY 4 HOURS PRN
Status: CANCELLED | OUTPATIENT
Start: 2018-01-01

## 2018-01-01 RX ORDER — PROMETHAZINE HYDROCHLORIDE 25 MG/1
6.25 TABLET ORAL EVERY 4 HOURS PRN
Status: DISCONTINUED | OUTPATIENT
Start: 2018-01-01 | End: 2018-01-01 | Stop reason: HOSPADM

## 2018-01-01 RX ORDER — MORPHINE SULFATE 20 MG/ML
5 SOLUTION ORAL
Status: DISCONTINUED | OUTPATIENT
Start: 2018-01-01 | End: 2018-01-01 | Stop reason: HOSPADM

## 2018-01-01 RX ORDER — SCOLOPAMINE TRANSDERMAL SYSTEM 1 MG/1
1 PATCH, EXTENDED RELEASE TRANSDERMAL
Status: DISCONTINUED | OUTPATIENT
Start: 2018-01-01 | End: 2018-01-01 | Stop reason: HOSPADM

## 2018-01-01 RX ORDER — LORAZEPAM 2 MG/ML
0.5 INJECTION INTRAMUSCULAR
Status: DISCONTINUED | OUTPATIENT
Start: 2018-01-01 | End: 2018-01-01 | Stop reason: HOSPADM

## 2018-01-01 RX ORDER — IPRATROPIUM BROMIDE AND ALBUTEROL SULFATE 2.5; .5 MG/3ML; MG/3ML
3 SOLUTION RESPIRATORY (INHALATION) EVERY 4 HOURS PRN
Status: CANCELLED | OUTPATIENT
Start: 2018-01-01

## 2018-01-01 RX ORDER — PROMETHAZINE HYDROCHLORIDE 12.5 MG/1
6.25 SUPPOSITORY RECTAL EVERY 4 HOURS PRN
Status: CANCELLED | OUTPATIENT
Start: 2018-01-01

## 2018-01-01 RX ORDER — VANCOMYCIN HYDROCHLORIDE 1 G/200ML
20 INJECTION, SOLUTION INTRAVENOUS ONCE
Status: COMPLETED | OUTPATIENT
Start: 2018-01-01 | End: 2018-01-01

## 2018-01-01 RX ORDER — SCOLOPAMINE TRANSDERMAL SYSTEM 1 MG/1
1 PATCH, EXTENDED RELEASE TRANSDERMAL
Status: CANCELLED | OUTPATIENT
Start: 2018-01-01

## 2018-01-01 RX ORDER — ACETAMINOPHEN 160 MG/5ML
650 SOLUTION ORAL EVERY 4 HOURS PRN
Status: CANCELLED | OUTPATIENT
Start: 2018-01-01

## 2018-01-01 RX ORDER — SODIUM CHLORIDE 0.9 % (FLUSH) 0.9 %
1-10 SYRINGE (ML) INJECTION AS NEEDED
Status: DISCONTINUED | OUTPATIENT
Start: 2018-01-01 | End: 2018-01-01

## 2018-01-01 RX ORDER — PHENOL 1.4 %
600 AEROSOL, SPRAY (ML) MUCOUS MEMBRANE DAILY
COMMUNITY

## 2018-01-01 RX ORDER — DIPHENOXYLATE HYDROCHLORIDE AND ATROPINE SULFATE 2.5; .025 MG/1; MG/1
1 TABLET ORAL
Status: DISCONTINUED | OUTPATIENT
Start: 2018-01-01 | End: 2018-01-01 | Stop reason: HOSPADM

## 2018-01-01 RX ORDER — ASPIRIN 81 MG/1
81 TABLET, CHEWABLE ORAL DAILY
COMMUNITY

## 2018-01-01 RX ORDER — HYDROCODONE BITARTRATE AND ACETAMINOPHEN 5; 325 MG/1; MG/1
1 TABLET ORAL EVERY 6 HOURS PRN
COMMUNITY

## 2018-01-01 RX ORDER — ACETAMINOPHEN 325 MG/1
650 TABLET ORAL EVERY 4 HOURS PRN
Status: CANCELLED | OUTPATIENT
Start: 2018-01-01

## 2018-01-01 RX ORDER — LORAZEPAM 2 MG/ML
1 INJECTION INTRAMUSCULAR
Status: CANCELLED | OUTPATIENT
Start: 2018-01-01 | End: 2018-09-29

## 2018-01-01 RX ORDER — LORAZEPAM 2 MG/ML
1 INJECTION INTRAMUSCULAR
Status: DISCONTINUED | OUTPATIENT
Start: 2018-01-01 | End: 2018-01-01 | Stop reason: HOSPADM

## 2018-01-01 RX ORDER — MORPHINE SULFATE 20 MG/ML
5 SOLUTION ORAL
Status: DISCONTINUED | OUTPATIENT
Start: 2018-01-01 | End: 2018-01-01 | Stop reason: CLARIF

## 2018-01-01 RX ORDER — ACETAMINOPHEN 650 MG/1
650 SUPPOSITORY RECTAL ONCE
Status: COMPLETED | OUTPATIENT
Start: 2018-01-01 | End: 2018-01-01

## 2018-01-01 RX ORDER — NICOTINE POLACRILEX 4 MG
15 LOZENGE BUCCAL
Status: DISCONTINUED | OUTPATIENT
Start: 2018-01-01 | End: 2018-01-01

## 2018-01-01 RX ORDER — LORAZEPAM 2 MG/ML
0.5 CONCENTRATE ORAL
Status: CANCELLED | OUTPATIENT
Start: 2018-01-01 | End: 2018-09-29

## 2018-01-01 RX ORDER — POTASSIUM CHLORIDE 1.5 G/1.77G
40 POWDER, FOR SOLUTION ORAL AS NEEDED
Status: DISCONTINUED | OUTPATIENT
Start: 2018-01-01 | End: 2018-01-01

## 2018-01-01 RX ORDER — GLYCOPYRROLATE 0.2 MG/ML
0.4 INJECTION INTRAMUSCULAR; INTRAVENOUS
Status: CANCELLED | OUTPATIENT
Start: 2018-01-01

## 2018-01-01 RX ORDER — PROMETHAZINE HYDROCHLORIDE 25 MG/1
6.25 TABLET ORAL EVERY 4 HOURS PRN
Status: CANCELLED | OUTPATIENT
Start: 2018-01-01

## 2018-01-01 RX ORDER — PREDNISONE 1 MG/1
1 TABLET ORAL DAILY
COMMUNITY

## 2018-01-01 RX ORDER — NOREPINEPHRINE BITARTRATE 1 MG/ML
INJECTION, SOLUTION INTRAVENOUS
Status: COMPLETED
Start: 2018-01-01 | End: 2018-01-01

## 2018-01-01 RX ORDER — DEXTROSE MONOHYDRATE 25 G/50ML
25 INJECTION, SOLUTION INTRAVENOUS
Status: DISCONTINUED | OUTPATIENT
Start: 2018-01-01 | End: 2018-01-01

## 2018-01-01 RX ADMIN — HYDROMORPHONE HYDROCHLORIDE 1 MG: 1 INJECTION, SOLUTION INTRAMUSCULAR; INTRAVENOUS; SUBCUTANEOUS at 15:49

## 2018-01-01 RX ADMIN — SCOPALAMINE 1 PATCH: 1 PATCH, EXTENDED RELEASE TRANSDERMAL at 13:07

## 2018-01-01 RX ADMIN — MORPHINE SULFATE 2 MG: 4 INJECTION INTRAVENOUS at 21:57

## 2018-01-01 RX ADMIN — LORAZEPAM 0.5 MG: 2 INJECTION INTRAMUSCULAR; INTRAVENOUS at 04:29

## 2018-01-01 RX ADMIN — CALCIUM GLUCONATE 2 G: 98 INJECTION, SOLUTION INTRAVENOUS at 15:30

## 2018-01-01 RX ADMIN — LORAZEPAM 1 MG: 2 INJECTION INTRAMUSCULAR; INTRAVENOUS at 13:07

## 2018-01-01 RX ADMIN — MORPHINE SULFATE 4 MG: 4 INJECTION INTRAVENOUS at 09:02

## 2018-01-01 RX ADMIN — VANCOMYCIN HYDROCHLORIDE 1000 MG: 1 INJECTION, SOLUTION INTRAVENOUS at 16:07

## 2018-01-01 RX ADMIN — LORAZEPAM 0.5 MG: 2 INJECTION INTRAMUSCULAR; INTRAVENOUS at 09:01

## 2018-01-01 RX ADMIN — SODIUM CHLORIDE 125 ML/HR: 9 INJECTION, SOLUTION INTRAVENOUS at 04:34

## 2018-01-01 RX ADMIN — CEFEPIME HYDROCHLORIDE 2 G: 2 INJECTION, POWDER, FOR SOLUTION INTRAVENOUS at 14:06

## 2018-01-01 RX ADMIN — HYDROCORTISONE SODIUM SUCCINATE 50 MG: 100 INJECTION, POWDER, FOR SOLUTION INTRAMUSCULAR; INTRAVENOUS at 00:38

## 2018-01-01 RX ADMIN — CEFEPIME HYDROCHLORIDE 2 G: 2 INJECTION, POWDER, FOR SOLUTION INTRAVENOUS at 01:00

## 2018-01-01 RX ADMIN — LORAZEPAM 1 MG: 2 INJECTION INTRAMUSCULAR; INTRAVENOUS at 04:45

## 2018-01-01 RX ADMIN — LORAZEPAM 0.5 MG: 2 INJECTION INTRAMUSCULAR; INTRAVENOUS at 19:38

## 2018-01-01 RX ADMIN — MORPHINE SULFATE 2 MG: 4 INJECTION INTRAVENOUS at 04:49

## 2018-01-01 RX ADMIN — CEFEPIME HYDROCHLORIDE 2 G: 2 INJECTION, POWDER, FOR SOLUTION INTRAVENOUS at 15:09

## 2018-01-01 RX ADMIN — DEXTROSE MONOHYDRATE 150 ML/HR: 5 INJECTION, SOLUTION INTRAVENOUS at 13:32

## 2018-01-01 RX ADMIN — MORPHINE SULFATE 4 MG: 4 INJECTION INTRAVENOUS at 21:06

## 2018-01-01 RX ADMIN — LORAZEPAM 1 MG: 2 INJECTION INTRAMUSCULAR; INTRAVENOUS at 16:36

## 2018-01-01 RX ADMIN — CEFEPIME HYDROCHLORIDE 2 G: 2 INJECTION, POWDER, FOR SOLUTION INTRAVENOUS at 14:21

## 2018-01-01 RX ADMIN — LORAZEPAM 2 MG: 2 INJECTION INTRAMUSCULAR; INTRAVENOUS at 15:49

## 2018-01-01 RX ADMIN — LORAZEPAM 0.5 MG: 2 INJECTION INTRAMUSCULAR; INTRAVENOUS at 10:58

## 2018-01-01 RX ADMIN — MORPHINE SULFATE 2 MG: 4 INJECTION INTRAVENOUS at 04:46

## 2018-01-01 RX ADMIN — POTASSIUM CHLORIDE 10 MEQ: 7.46 INJECTION, SOLUTION INTRAVENOUS at 10:14

## 2018-01-01 RX ADMIN — MORPHINE SULFATE 4 MG: 10 INJECTION INTRAVENOUS at 23:18

## 2018-01-01 RX ADMIN — CALCIUM GLUCONATE 2 G: 98 INJECTION, SOLUTION INTRAVENOUS at 22:30

## 2018-01-01 RX ADMIN — MORPHINE SULFATE 2 MG: 4 INJECTION INTRAVENOUS at 09:35

## 2018-01-01 RX ADMIN — POTASSIUM CHLORIDE 10 MEQ: 7.46 INJECTION, SOLUTION INTRAVENOUS at 12:05

## 2018-01-01 RX ADMIN — SODIUM CHLORIDE 1485 ML: 9 INJECTION, SOLUTION INTRAVENOUS at 23:33

## 2018-01-01 RX ADMIN — ACETAMINOPHEN 650 MG: 650 SUPPOSITORY RECTAL at 23:31

## 2018-01-01 RX ADMIN — LORAZEPAM 0.5 MG: 2 INJECTION INTRAMUSCULAR; INTRAVENOUS at 21:06

## 2018-01-01 RX ADMIN — CEFEPIME HYDROCHLORIDE 2 G: 2 INJECTION, POWDER, FOR SOLUTION INTRAVENOUS at 03:01

## 2018-01-01 RX ADMIN — CEFEPIME HYDROCHLORIDE 2 G: 2 INJECTION, POWDER, FOR SOLUTION INTRAVENOUS at 12:12

## 2018-01-01 RX ADMIN — HYDROCORTISONE SODIUM SUCCINATE 100 MG: 100 INJECTION, POWDER, FOR SOLUTION INTRAMUSCULAR; INTRAVENOUS at 06:06

## 2018-01-01 RX ADMIN — CALCIUM GLUCONATE 2 G: 98 INJECTION, SOLUTION INTRAVENOUS at 11:04

## 2018-01-01 RX ADMIN — MORPHINE SULFATE 2 MG: 4 INJECTION INTRAVENOUS at 20:29

## 2018-01-01 RX ADMIN — DEXTROSE MONOHYDRATE 200 ML/HR: 5 INJECTION, SOLUTION INTRAVENOUS at 10:14

## 2018-01-01 RX ADMIN — LORAZEPAM 1 MG: 2 INJECTION INTRAMUSCULAR; INTRAVENOUS at 00:41

## 2018-01-01 RX ADMIN — IPRATROPIUM BROMIDE AND ALBUTEROL SULFATE 3 ML: .5; 3 SOLUTION RESPIRATORY (INHALATION) at 18:56

## 2018-01-01 RX ADMIN — LORAZEPAM 1 MG: 2 INJECTION INTRAMUSCULAR; INTRAVENOUS at 09:35

## 2018-01-01 RX ADMIN — HYDROCORTISONE SODIUM SUCCINATE 100 MG: 100 INJECTION, POWDER, FOR SOLUTION INTRAMUSCULAR; INTRAVENOUS at 13:32

## 2018-01-01 RX ADMIN — CEFEPIME HYDROCHLORIDE 2 G: 2 INJECTION, POWDER, FOR SOLUTION INTRAVENOUS at 00:43

## 2018-01-01 RX ADMIN — MORPHINE SULFATE 4 MG: 10 INJECTION INTRAVENOUS at 19:33

## 2018-01-01 RX ADMIN — HYDROCORTISONE SODIUM SUCCINATE 25 MG: 100 INJECTION, POWDER, FOR SOLUTION INTRAMUSCULAR; INTRAVENOUS at 02:00

## 2018-01-01 RX ADMIN — NOREPINEPHRINE BITARTRATE 0.02 MCG/KG/MIN: 8 SOLUTION at 03:15

## 2018-01-01 RX ADMIN — DEXTROSE MONOHYDRATE 100 ML/HR: 5 INJECTION, SOLUTION INTRAVENOUS at 06:42

## 2018-01-01 RX ADMIN — LORAZEPAM 1 MG: 2 INJECTION INTRAMUSCULAR; INTRAVENOUS at 04:48

## 2018-01-01 RX ADMIN — LORAZEPAM 1 MG: 2 INJECTION INTRAMUSCULAR; INTRAVENOUS at 21:58

## 2018-01-01 RX ADMIN — GLYCOPYRROLATE 0.2 MG: 0.2 INJECTION, SOLUTION INTRAMUSCULAR; INTRAVENOUS at 13:07

## 2018-01-01 RX ADMIN — HYDROCORTISONE SODIUM SUCCINATE 50 MG: 100 INJECTION, POWDER, FOR SOLUTION INTRAMUSCULAR; INTRAVENOUS at 06:42

## 2018-01-01 RX ADMIN — MORPHINE SULFATE 4 MG: 10 INJECTION INTRAVENOUS at 23:56

## 2018-01-01 RX ADMIN — HYDROCORTISONE SODIUM SUCCINATE 100 MG: 100 INJECTION, POWDER, FOR SOLUTION INTRAMUSCULAR; INTRAVENOUS at 06:00

## 2018-01-01 RX ADMIN — LORAZEPAM 0.5 MG: 2 INJECTION INTRAMUSCULAR; INTRAVENOUS at 14:21

## 2018-01-01 RX ADMIN — VANCOMYCIN HYDROCHLORIDE 1000 MG: 1 INJECTION, SOLUTION INTRAVENOUS at 01:52

## 2018-01-01 RX ADMIN — MORPHINE SULFATE 2 MG: 4 INJECTION INTRAVENOUS at 13:13

## 2018-01-01 RX ADMIN — SODIUM CHLORIDE 1000 ML: 9 INJECTION, SOLUTION INTRAVENOUS at 09:48

## 2018-01-01 RX ADMIN — CALCIUM GLUCONATE 2 G: 98 INJECTION, SOLUTION INTRAVENOUS at 19:47

## 2018-01-01 RX ADMIN — CEFEPIME HYDROCHLORIDE 2 G: 2 INJECTION, POWDER, FOR SOLUTION INTRAVENOUS at 00:00

## 2018-01-01 RX ADMIN — MORPHINE SULFATE 4 MG: 10 INJECTION INTRAVENOUS at 18:18

## 2018-01-01 RX ADMIN — SODIUM CHLORIDE 1000 ML: 9 INJECTION, SOLUTION INTRAVENOUS at 11:18

## 2018-01-01 RX ADMIN — LORAZEPAM 1 MG: 2 INJECTION INTRAMUSCULAR; INTRAVENOUS at 13:13

## 2018-01-01 RX ADMIN — NOREPINEPHRINE BITARTRATE: 1 INJECTION, SOLUTION, CONCENTRATE INTRAVENOUS at 03:15

## 2018-01-01 RX ADMIN — IPRATROPIUM BROMIDE AND ALBUTEROL SULFATE 3 ML: .5; 3 SOLUTION RESPIRATORY (INHALATION) at 08:05

## 2018-01-01 RX ADMIN — HYDROCORTISONE SODIUM SUCCINATE 50 MG: 100 INJECTION, POWDER, FOR SOLUTION INTRAMUSCULAR; INTRAVENOUS at 13:52

## 2018-01-01 RX ADMIN — MORPHINE SULFATE 4 MG: 10 INJECTION INTRAVENOUS at 08:52

## 2018-01-01 RX ADMIN — LORAZEPAM 1 MG: 2 INJECTION INTRAMUSCULAR; INTRAVENOUS at 20:29

## 2018-01-01 RX ADMIN — MORPHINE SULFATE 4 MG: 10 INJECTION INTRAVENOUS at 15:09

## 2018-01-01 RX ADMIN — DEXTROSE MONOHYDRATE 100 ML/HR: 5 INJECTION, SOLUTION INTRAVENOUS at 10:41

## 2018-01-01 RX ADMIN — POTASSIUM PHOSPHATE, MONOBASIC AND POTASSIUM PHOSPHATE, DIBASIC 30 MMOL: 224; 236 INJECTION, SOLUTION, CONCENTRATE INTRAVENOUS at 13:52

## 2018-01-01 RX ADMIN — MORPHINE SULFATE 4 MG: 10 INJECTION INTRAVENOUS at 15:34

## 2018-01-01 RX ADMIN — POTASSIUM PHOSPHATE, MONOBASIC AND POTASSIUM PHOSPHATE, DIBASIC 30 MMOL: 224; 236 INJECTION, SOLUTION, CONCENTRATE INTRAVENOUS at 14:14

## 2018-01-01 RX ADMIN — LORAZEPAM 0.5 MG: 2 INJECTION INTRAMUSCULAR; INTRAVENOUS at 23:17

## 2018-01-01 RX ADMIN — LORAZEPAM 0.5 MG: 2 INJECTION INTRAMUSCULAR; INTRAVENOUS at 15:34

## 2018-01-01 RX ADMIN — DIPHENOXYLATE HYDROCHLORIDE AND ATROPINE SULFATE 1 TABLET: 2.5; .025 TABLET ORAL at 01:33

## 2018-01-01 RX ADMIN — MORPHINE SULFATE 2 MG: 4 INJECTION INTRAVENOUS at 00:41

## 2018-01-01 RX ADMIN — CEFEPIME HYDROCHLORIDE 2 G: 2 INJECTION, POWDER, FOR SOLUTION INTRAVENOUS at 00:38

## 2018-01-01 RX ADMIN — LORAZEPAM 0.5 MG: 2 INJECTION INTRAMUSCULAR; INTRAVENOUS at 22:45

## 2018-01-01 RX ADMIN — HYDROCORTISONE SODIUM SUCCINATE 100 MG: 100 INJECTION, POWDER, FOR SOLUTION INTRAMUSCULAR; INTRAVENOUS at 23:00

## 2018-01-01 RX ADMIN — POTASSIUM CHLORIDE 10 MEQ: 7.46 INJECTION, SOLUTION INTRAVENOUS at 13:30

## 2018-01-01 RX ADMIN — VANCOMYCIN HYDROCHLORIDE 500 MG: 500 INJECTION, POWDER, LYOPHILIZED, FOR SOLUTION INTRAVENOUS at 13:00

## 2018-01-01 RX ADMIN — LORAZEPAM 0.5 MG: 2 INJECTION INTRAMUSCULAR; INTRAVENOUS at 07:39

## 2018-01-01 RX ADMIN — MORPHINE SULFATE 4 MG: 4 INJECTION INTRAVENOUS at 10:58

## 2018-01-01 RX ADMIN — MORPHINE SULFATE 2 MG: 4 INJECTION INTRAVENOUS at 16:36

## 2018-01-01 RX ADMIN — MORPHINE SULFATE 2 MG: 4 INJECTION INTRAVENOUS at 13:07

## 2018-09-16 PROBLEM — A41.9 SEPSIS (HCC): Status: ACTIVE | Noted: 2018-01-01

## 2018-09-16 NOTE — PROGRESS NOTES
"Pharmacokinetic Consult - Vancomycin Dosing (Follow-up Note)    Kaykay Cedeno is a 89 y.o. female who is on day 1 pharmacy to dose vancomycin for sepsis.  Pharmacy dosing vancomycin per Dr. Spain's request.   Other antimicrobials: cefepime (Day 1)  Goal trough: 15-20 mg/L    Current Vancomycin dose: intermittent    Relevant clinical data and objective history reviewed:  162.6 cm (64\")  48.4 kg (106 lb 11.2 oz)  Body mass index is 18.32 kg/m².     She has a past medical history of Abnormal posture; Alzheimer disease; Anxiety disorder; Asthma; Chronic pain; Chronic pain syndrome; Constipation; COPD (chronic obstructive pulmonary disease) (CMS/[facility]); Dementia; Disease of thyroid gland; Dysphagia, oropharyngeal phase; GERD (gastroesophageal reflux disease); Heart failure (CMS/[facility]); Hemorrhoids; Hyperlipidemia; Hypertension; Hypokalemia; Osteoporosis; Pneumonia; Spondylosis without myelopathy or radiculopathy, site unspecified; and Unspecified osteoarthritis, unspecified site.    Allergies as of 09/15/2018 - Reviewed 09/15/2018   Allergen Reaction Noted   • Bactrim [sulfamethoxazole-trimethoprim] Unknown (See Comments) 09/15/2018     Vital Signs (last 24 hours)       09/15 0700  -  09/16 0659 09/16 0700  -  09/16 1447   Most Recent    Temp (°F) 99.3 -  (!)101.8      98.5     98.5 (36.9)    Heart Rate 77 -  105    76 -  96     93    Resp 18 -  24      19     19    BP (!)72/46 -  106/63    (!)79/49 -  115/83     108/72    SpO2 (%) 92 -  100    (!)86 -  96     92        Estimated Creatinine Clearance: 13.9 mL/min (A) (by C-G formula based on SCr of 2.09 mg/dL (H)).    Results from last 7 days  Lab Units 09/16/18  0617 09/15/18  2319 09/15/18  1834   CREATININE mg/dL 2.09* 2.60* 2.47*       Results from last 7 days  Lab Units 09/16/18  0735 09/15/18  2319 09/15/18  1834   WBC 10*3/mm3 19.33* 19.80* 19.42*     Baseline culture/source/susceptibility:   BCx x2 sets (9/15): NGTD     Imaging:  CXR (9/15): \"Poor inspiration without " "active disease identified\"    Labs:  PCT (9/15): 0.71    Anti-Infectives     Ordered     Dose/Rate Route Frequency Start Stop    09/16/18 0504  cefepime (MAXIPIME) 1 g/100 mL 0.9% NS IVPB (mbp)     Ordering Provider:  Ata Spain MD    1 g  over 4 Hours Intravenous Every 24 Hours 09/17/18 0100 09/23/18 0059    09/16/18 0303  Vancomycin Pharmacy Intermittent Dosing     Ordering Provider:  Ata Spain MD     Does not apply Daily 09/16/18 0900 09/26/18 0859    09/16/18 0258  Pharmacy to dose vancomycin     Ordering Provider:  Ata Spain MD     Does not apply Continuous PRN 09/16/18 0254 09/26/18 0253    09/16/18 0032  vancomycin (VANCOCIN) in iso-osmotic dextrose IVPB 1 g (premix) 200 mL     Ordering Provider:  Jam Bryson MD    20 mg/kg × 49.4 kg Intravenous Once 09/16/18 0034 09/16/18 0152    09/16/18 0032  cefepime (MAXIPIME) 2 g/100 mL 0.9% NS (mbp)     Ordering Provider:  Jam Bryson MD    2 g  200 mL/hr over 30 Minutes Intravenous Once 09/16/18 0034 09/16/18 0119         Vancomycin Dosing History  1000 mg (20 mg/kg) IV x1: 09/16/18 0152   Random 09/16/18 1339: 12.6 mg/L     Assessment/Plan  1) Vancomycin 1000 mg IV x1 dose now. Will check random level tomorrow with AM labs. Consider re-dosing when level expected to be < 20 mg/L.   2) Will monitor serum creatinine tomorrow with AM labs.   3) Encourage hydration as allowed by MD to help prevent toxic accumulation; monitor for s/sxn of toxicity including increase in SCr and decrease in UOP.    Pharmacy will continue to follow daily while on vancomycin and adjust as needed.     Thank you for this consult,    Tim Vyas, PharmD, CIPRIANO, BCPS    "

## 2018-09-16 NOTE — PLAN OF CARE
Problem: Skin Injury Risk (Adult)  Goal: Identify Related Risk Factors and Signs and Symptoms  Outcome: Ongoing (interventions implemented as appropriate)   09/16/18 0643   Skin Injury Risk (Adult)   Related Risk Factors (Skin Injury Risk) advanced age;cognitive impairment;critical care admission;mobility impaired;tissue perfusion altered     Goal: Skin Health and Integrity  Outcome: Ongoing (interventions implemented as appropriate)   09/16/18 0643   Skin Injury Risk (Adult)   Skin Health and Integrity making progress toward outcome       Problem: Fall Risk (Adult)  Goal: Identify Related Risk Factors and Signs and Symptoms  Outcome: Ongoing (interventions implemented as appropriate)   09/16/18 0643   Fall Risk (Adult)   Related Risk Factors (Fall Risk) age-related changes;confusion/agitation;history of falls;neuro disease/injury;sensory deficits   Signs and Symptoms (Fall Risk) presence of risk factors     Goal: Absence of Fall  Outcome: Ongoing (interventions implemented as appropriate)   09/16/18 0643   Fall Risk (Adult)   Absence of Fall making progress toward outcome       Problem: Patient Care Overview  Goal: Plan of Care Review  Outcome: Ongoing (interventions implemented as appropriate)   09/16/18 0643   Coping/Psychosocial   Plan of Care Reviewed With family   Plan of Care Review   Progress improving   OTHER   Outcome Summary Pt arrived to the ICU at 0200, Started Levo per order to maintain MAP of 65, Pt [place] spoke to guardian, Pt is a DNR with limited interventions. Continue to monitor      Goal: Discharge Needs Assessment   09/16/18 0206 09/16/18 0211   Disability   Equipment Currently Used at Home walker, standard --    Discharge Needs Assessment   Patient/Family Anticipates Transition to --  inpatient rehabilitation facility   Patient/Family Anticipated Services at Transition --  none   Transportation Anticipated --  agency     Goal: Interprofessional Rounds/Family Conf  Outcome:  Ongoing (interventions implemented as appropriate)      Problem: Sepsis/Septic Shock (Adult)  Goal: Signs and Symptoms of Listed Potential Problems Will be Absent, Minimized or Managed (Sepsis/Septic Shock)  Outcome: Ongoing (interventions implemented as appropriate)   09/16/18 0643   Goal/Outcome Evaluation   Problems Assessed (Sepsis) all   Problems Present (Sepsis) hypoperfusion/hemodynamic instability;infection progression;situational response

## 2018-09-16 NOTE — SIGNIFICANT NOTE
09/16/18 1314   Rehab Time/Intention   Evaluation Not Performed patient/family decline, not feeling well  (Pt not responsive at this time per RN Antionette. Will follow up tomorrow. )   Rehab Treatment   Discipline physical therapist   Recommendation   PT - Next Appointment 09/17/18

## 2018-09-16 NOTE — CONSULTS
Thank you very much for the consult    Reason For The Consult: ACUTE RENAL FAILURE , severe hypernatremia , hypotension, dehydration    HPI: 89 year old white female brought in with ms changes and hypotension, renal failure and hypernatremia , the pt. Has dementia  And dependent on others for hydration and nutrition, reason for hypernatremia seem to be inability to access to liquids  PMH:   Past Medical History:   Diagnosis Date   • Abnormal posture    • Alzheimer disease    • Anxiety disorder    • Asthma    • Chronic pain    • Chronic pain syndrome    • Constipation    • COPD (chronic obstructive pulmonary disease) (CMS/[facility])    • Dementia    • Disease of thyroid gland    • Dysphagia, oropharyngeal phase    • GERD (gastroesophageal reflux disease)    • Heart failure (CMS/[facility])    • Hemorrhoids    • Hyperlipidemia    • Hypertension    • Hypokalemia    • Osteoporosis    • Pneumonia    • Spondylosis without myelopathy or radiculopathy, site unspecified    • Unspecified osteoarthritis, unspecified site     History reviewed. No pertinent surgical history.  FHX: History reviewed. No pertinent family history.  SHX:   History   Alcohol use Not on file      History   Smoking Status   • Not on file   Smokeless Tobacco   • Not on file      History   Drug use: Unknown    no drug use    Home Medications:   Prescriptions Prior to Admission   Medication Sig Dispense Refill Last Dose   • acetaminophen (TYLENOL) 500 MG tablet Take 1,000 mg by mouth Every 6 (Six) Hours As Needed for Mild Pain .      • aspirin 81 MG chewable tablet Chew 81 mg Daily.      • calcium carbonate (OS-ABBIE) 600 MG tablet Take 600 mg by mouth Daily.      • Cholecalciferol (VITAMIN D3) 5000 units capsule capsule Take 1,000 Units by mouth Daily.      • famotidine (PEPCID) 20 MG tablet Take 20 mg by mouth 2 (Two) Times a Day.      • furosemide (LASIX) 20 MG tablet Take 20 mg by mouth Daily.      • HYDROcodone-acetaminophen (NORCO) 5-325 MG per tablet Take 1  tablet by mouth Every 6 (Six) Hours As Needed.      • metoprolol tartrate (LOPRESSOR) 50 MG tablet Take 50 mg by mouth 2 (Two) Times a Day.      • mirtazapine (REMERON) 15 MG tablet Take 7.5 mg by mouth Every Night.      • predniSONE (DELTASONE) 1 MG tablet Take 1 mg by mouth Daily.      • sennosides-docusate sodium (SENOKOT-S) 8.6-50 MG tablet Take 1 tablet by mouth Daily.      • valsartan (DIOVAN) 80 MG tablet Take 80 mg by mouth Daily.          Allergies:   Allergies   Allergen Reactions   • Bactrim [Sulfamethoxazole-Trimethoprim] Unknown (See Comments)     Zahira Frye RN       Physical Exam:  General: lethargic    Vital Signs  Vitals:    09/16/18 1132   BP: 97/69   Pulse: 84   Resp:    Temp:    SpO2: 94%     No intake/output data recorded.  No intake/output data recorded.      HEENT:  Normo cephalic, Atraumatic, EOMI, PERRLA, NO icterus,  Neck:  Supple, No JVD, No Carotid artery bruit, No lymphadenopathy  Chest: Clear to auscultation bilaterally,   Cardiovascular: Regular rate and rhythm. Positive S1 & S2, No rub, murmur or gallop.  Abdominal: Soft, non tender, no palpable organomegaly, no abdominal bruit, positive bowel sounds  Musculoskeletal: No joint tenderness or swelling, good range of motion, Adequate muscle strength on both sides, no cyanosis, clubbing or edema on lower extremities.  Neuro: open eyes and moans      Review of Systems:   [unfilled]  Sodium 170 ,creatininen2.09. Giovanni: no hydro.  Current Radiology    Current Meds    Current Facility-Administered Medications:   •  [START ON 9/17/2018] cefepime (MAXIPIME) 1 g/100 mL 0.9% NS IVPB (mbp), 1 g, Intravenous, Q24H, Ata Spain MD  •  hydrocortisone sodium succinate (Solu-CORTEF) injection 100 mg, 100 mg, Intravenous, Q8H, Ata Spain MD, 100 mg at 09/16/18 0606  •  norepinephrine (LEVOPHED) 8 mg/250 mL (32 mcg/mL) in sodium chloride 0.9% infusion (premix), 0.02-0.3 mcg/kg/min, Intravenous, Titrated, Ata Spain MD, Stopped at 09/16/18  0948  •  Pharmacy to dose vancomycin, , Does not apply, Continuous PRN, Ata Spain MD  •  sodium chloride 0.9 % flush 1-10 mL, 1-10 mL, Intravenous, PRN, Ata Spain MD  •  sodium chloride 0.9 % flush 10 mL, 10 mL, Intravenous, PRN, Jam Bryson MD  •  Insert peripheral IV, , , Once **AND** sodium chloride 0.9 % flush 10 mL, 10 mL, Intravenous, PRN, Jam Bryson MD  •  sodium chloride 0.9 % infusion, 125 mL/hr, Intravenous, Continuous, Ata Spain MD, Last Rate: 125 mL/hr at 09/16/18 0434, 125 mL/hr at 09/16/18 0434  •  Vancomycin Pharmacy Intermittent Dosing, , Does not apply, Daily, Ata Spain MD              Patient Active Problem List   Diagnosis   • Sepsis (CMS/HCC)   acute renal failure  Secondary to sepsis and sever intravascular volume depletion and dehydration, will switch to d5w  150 cc /hour  Try placing dobhoff and giving nutrition and free water. Will follow closely.  Spent 37 minutes caring for this critically ill patient with multiorgan dysfunction.        Arun Gilmore MD FACP, FASN.  09/16/18  1:06 PM

## 2018-09-16 NOTE — PROGRESS NOTES
Kasi Lindsay MD                          [phone]      Patient ID:    Name:  Kaykay Cedeno    MRN:  [id number]    3/6/1929   89 y.o.  female            Patient Care Team:  Lars Ambriz MD as PCP - General (Family Medicine)  Zaida Mcdowell APRN as PCP - Claims Attributed    CC/Reason for visit:     Subjective: Pt seen and examined this AM. No acute overnight events noted. Doing the same. Very lethargic and follows commands after multiple attempts.     ROS:   Cannot obtain     Objective     Vital Signs past 24hrs    BP range: BP: ()/(46-68) 109/66  Pulse range: Heart Rate:  [] 81  Resp rate range: Resp:  [18-24] 19  Temp range: Temp (24hrs), Av.9 °F (37.7 °C), Min:98.5 °F (36.9 °C), Max:101.8 °F (38.8 °C)    Ventilator/Non-Invasive Ventilation Settings     None        Device (Oxygen Therapy): nasal cannula       48.4 kg (106 lb 11.2 oz); Body mass index is 18.32 kg/m².    No intake or output data in the 24 hours ending 18 0846    Exam:    GEN:  Elderly female in mild resp distress, lethargic, slow to follow commands  EYES:   EOM-i, anicteric sclera bilat  ENT:    External ears/nose normal, OP clear/ moist mucosa  NECK:  Supple, midline trachea, No JVD or cervical LApathy  LUNGS: Bilateral breath sounds heard, B/l rhonchi and upper airway sounds  CV:  Regular rate and rhythm, No murmur  ABD:  diffusely tender, mildly distended, no palpable liver or masses  EXT:  No cyanosis or clubbing, no peripheral/sacral edema    Scheduled meds:    [START ON 2018] cefepime 1 g Intravenous Q24H   hydrocortisone sodium succinate 100 mg Intravenous Q8H   Vancomycin Pharmacy Intermittent Dosing  Does not apply Daily       IV meds:                        norepinephrine 0.02-0.3 mcg/kg/min Last Rate: 0.04 mcg/kg/min (18 0844)   Pharmacy to dose vancomycin     sodium chloride 125 mL/hr Last Rate: 125 mL/hr (18 3424)       Data  Review:        Results from last 7 days  Lab Units 09/16/18  0735 09/16/18  0617 09/15/18  2319 09/15/18  1834   SODIUM mmol/L  --  170* 172* 173*   POTASSIUM mmol/L  --  4.0 4.8 4.5   CHLORIDE mmol/L  --  135* 129* 125*   CO2 mmol/L  --  22.2 28.1 30.5*   BUN mg/dL  --  70* 85* 80*   CREATININE mg/dL  --  2.09* 2.60* 2.47*   CALCIUM mg/dL  --  6.7* 8.3* 8.4*   BILIRUBIN mg/dL  --   --  0.5  --    ALK PHOS U/L  --   --  74  --    ALT (SGPT) U/L  --   --  22  --    AST (SGOT) U/L  --   --  26  --    GLUCOSE mg/dL  --  93 84 73   WBC 10*3/mm3 19.33*  --  19.80* 19.42*   HEMOGLOBIN g/dL 11.3*  --  12.6 13.3   PLATELETS 10*3/mm3 142  --  142 163   INR   --   --  1.86*  --    PROBNP pg/mL  --   --  4,442.0*  --    PROCALCITONIN ng/mL  --   --  0.71*  --        Lab Results   Component Value Date    CALCIUM 6.7 (L) 09/16/2018               Results from last 7 days  Lab Units 09/15/18  2319   TROPONIN T ng/mL 0.106*       Results Review:    I have reviewed the available laboratory results and reviewed the chest imaging personally    Imaging Results (all)     Procedure Component Value Units Date/Time    XR Chest 1 View [[id number]] Collected:  09/15/18 2337     Updated:  09/15/18 2341    Narrative:       PORTABLE CHEST X-RAY     HISTORY: soa     COMPARISON: None.     FINDINGS: Portable AP view of the chest was obtained. Patient is rotated  to the left. Lungs are under aerated but grossly  clear where  visualized.  There are calcified residua of granulomatous disease  present. Heart size and pulmonary vasculature are likely normal  considering poor inspiration.   No obvious significant pleural fluid  collections. Extensive vascular calculi. Generalized osteopenia. Chronic  deformity of the proximal left humerus noted          Impression:          Poor inspiration without active disease identified,     This report was finalized on 9/15/2018 11:38 PM by Lei Gillis M.D.               ASSESSMENT:   Sepsis ?  source  Hypernatremia severe  Acute renal failure  Severe dehydration  Troponin leak   Acute metabolic encephalopathy   Advanced dementia  History of COPD  History of dysphagia  DNR    PLAN:  Continued aggressive fluid hydration given severe dehydration and pressors.  We will give 2 more boluses of fluids.  We will continue with close monitoring of sodium given severe hypernatremia.  Expected to improve slowly. We will check for overcorrection.  Awaiting nephrology input.  Will trend troponin. No cp reported. EKG shows no acute changes.   Will keep NPO while pt is altered.   Will get a KUB/ RUQ US.   C/w emperic abx     DNR/ No invasive procedures per family   DVT ppx     CC - 45 mins    I have discussed my findings and recommendations with patient, family and consulting provider.     Kasi Lindsay MD  9/16/2018

## 2018-09-16 NOTE — PROGRESS NOTES
"Pharmacokinetic Consult - Vancomycin Dosing (Initial Note)    Kaykay Cedeno has been consulted for pharmacy to dose vancomycin for sepsis.  Pharmacy dosing vancomycin per Dr Spain's request.   Goal trough: 15-20 mg/L   Other antimicrobials: Cefepime    Relevant clinical data and objective history reviewed:  89 y.o. female 162.6 cm (64\") 48.4 kg (106 lb 11.2 oz)    Past Medical History:   Diagnosis Date   • Abnormal posture    • Alzheimer disease    • Anxiety disorder    • Asthma    • Chronic pain    • Chronic pain syndrome    • Constipation    • COPD (chronic obstructive pulmonary disease) (CMS/[facility])    • Dementia    • Disease of thyroid gland    • Dysphagia, oropharyngeal phase    • GERD (gastroesophageal reflux disease)    • Heart failure (CMS/[facility])    • Hemorrhoids    • Hyperlipidemia    • Hypertension    • Hypokalemia    • Osteoporosis    • Pneumonia    • Spondylosis without myelopathy or radiculopathy, site unspecified    • Unspecified osteoarthritis, unspecified site      Creatinine   Date Value Ref Range Status   09/15/2018 2.60 (H) 0.57 - 1.00 mg/dL Final   09/15/2018 2.47 (H) 0.57 - 1.00 mg/dL Final     BUN   Date Value Ref Range Status   09/15/2018 85 (H) 8 - 23 mg/dL Final     Estimated Creatinine Clearance: 11.2 mL/min (A) (by C-G formula based on SCr of 2.6 mg/dL (H)).    Lab Results   Component Value Date    WBC 19.80 (H) 09/15/2018     Temp Readings from Last 3 Encounters:   09/16/18 99.3 °F (37.4 °C) (Oral)           Assessment/Plan  Loading dose of vancomycin 1,000 mg IV (20mg/kg). Will monitor serum creatinine every 24 hours for the first 3 days then at least every 48 hours per dosing recommendations. Vancomycin level at 14:00 today, clinical pharmacist will reassess dosing.  Pharmacy will continue to follow daily while on vancomycin and adjust as needed.     Danny Corona, [facility]    "

## 2018-09-17 PROBLEM — E83.39 HYPOPHOSPHATASIA: Status: ACTIVE | Noted: 2018-01-01

## 2018-09-17 NOTE — NURSING NOTE
Daughter at bedside. Explained to daughter that feeding tube placement and tube feeding has been ordered. Daughter states that patient has a living will that states no feeding tube and no artifical nutrition. I spoke with Laura from [facility] about the situation. She confirms that patient had stated in her living will that she did not want feeding tube and artificial nutrition. She agrees on withholding those items at this time. Guardian is aware of risks and benefits of this and she supports patients wishes. The current agreed upon plan is to watch patient for improvement and have speech pathology evaluate for safety of oral intake when appropriate. If patient is unable to safely eat in the next few days, another discussion will need to take place about plan of care.

## 2018-09-17 NOTE — PLAN OF CARE
Problem: Patient Care Overview  Goal: Plan of Care Review   09/17/18 1217   OTHER   Outcome Summary pt is dependent at baseline, no PT intervention indicated at this time, will sign off,  Discussed with RN

## 2018-09-17 NOTE — PROGRESS NOTES
"Pharmacokinetic Consult - Vancomycin Dosing (Follow-up Note)    Kaykay Cedeno is on day 2 pharmacy to dose vancomycin for sepsis.  Pharmacy dosing vancomycin per Dr. Spain's request.   Goal trough: 15-20 mg/L     Relevant clinical data and objective history reviewed:  89 y.o. female 162.6 cm (64\") 48.2 kg (106 lb 4.2 oz)    Vital Signs (last 24 hours)       09/16 0700  -  09/17 0659 09/17 0700  -  09/17 1157   Most Recent    Temp (°F) 97.4 -  98.7    97.3 -  98.2     97.3 (36.3)    Heart Rate 63 -  96    75 -  76     76    Resp   19           BP (!)79/49 -  133/61      102/61     102/61    SpO2 (%) (!)86 -  100      97     97          Creatinine   Date Value Ref Range Status   09/17/2018 1.37 (H) 0.57 - 1.00 mg/dL Final   09/16/2018 2.09 (H) 0.57 - 1.00 mg/dL Final   09/15/2018 2.60 (H) 0.57 - 1.00 mg/dL Final     BUN   Date Value Ref Range Status   09/17/2018 51 (H) 8 - 23 mg/dL Final     Estimated Creatinine Clearance: 21.2 mL/min (A) (by C-G formula based on SCr of 1.37 mg/dL (H)).    Lab Results   Component Value Date    WBC 19.33 (H) 09/16/2018       Baseline culture/source/susceptibility:   9/15: Blood NGTD    Anti-Infectives     Ordered     Dose/Rate Route Frequency Start Stop    09/16/18 1448  cefepime (MAXIPIME) 2 g/100 mL 0.9% NS (mbp)     Ordering Provider:  Ata Spain MD    2 g  over 4 Hours Intravenous Every 24 Hours 09/17/18 0100 09/23/18 0059    09/16/18 1548  vancomycin (VANCOCIN) in iso-osmotic dextrose IVPB 1 g (premix) 200 mL     Ordering Provider:  Ata Spain MD    20 mg/kg × 48.4 kg  over 100 Minutes Intravenous Once 09/16/18 1645 09/16/18 1747    09/16/18 0303  Vancomycin Pharmacy Intermittent Dosing     Ordering Provider:  Ata Spain MD     Does not apply Daily 09/16/18 0900 09/26/18 0859    09/16/18 0258  Pharmacy to dose vancomycin     Ordering Provider:  Ata Spain MD     Does not apply Continuous PRN 09/16/18 0254 09/26/18 0253    09/16/18 0032  vancomycin (VANCOCIN) in " iso-osmotic dextrose IVPB 1 g (premix) 200 mL     Ordering Provider:  Jam Bryson MD    20 mg/kg × 49.4 kg Intravenous Once 09/16/18 0034 09/16/18 0152    09/16/18 0032  cefepime (MAXIPIME) 2 g/100 mL 0.9% NS (mbp)     Ordering Provider:  Jam Bryson MD    2 g  200 mL/hr over 30 Minutes Intravenous Once 09/16/18 0034 09/16/18 0119         Lab Results   Component Value Date    VANCOTROUGH 12.60 09/16/2018     Lab Results   Component Value Date    VancoRANDOM 23.00 09/17/2018     Vancomycin Dosing History  1000 mg (20 mg/kg) IV x1: 09/16/18 0152              Random 09/16/18 1339: 12.6 mg/L  1000 mg (20 mg/kg) IV x 1: 09/16/18 1607   Random 09/16/18 0718: 23 mg/L    Assessment/Plan  Reviewed chart.  Renal function has improved.    1. Vancomycin random level at 1900 to assess if a dose of vancomycin is warranted.   2. Creatinine in AM     Pharmacy will continue to follow daily while on vancomycin and adjust as needed.     Anu Matos, PharmD, BCPS

## 2018-09-17 NOTE — PROGRESS NOTES
Kasi Lindsay MD                          [phone]      Patient ID:    Name:  Kaykay Cedeno    MRN:  [id number]    3/6/1929   89 y.o.  female            Patient Care Team:  Lars Ambriz MD as PCP - General (Family Medicine)  Zaida Mcdowell APRN as PCP - Claims Attributed    CC/Reason for visit:     Subjective: Pt seen and examined this AM. No acute overnight events noted. Doing the same. Sodium improved. Off pressors. Still very lethargic and follows commands after multiple attempts.     ROS:   Cannot obtain     Objective     Vital Signs past 24hrs    BP range: BP: ()/(46-83) 102/61  Pulse range: Heart Rate:  [63-96] 76  Resp rate range:    Temp range: Temp (24hrs), Av.2 °F (36.8 °C), Min:97.4 °F (36.3 °C), Max:98.7 °F (37.1 °C)    Ventilator/Non-Invasive Ventilation Settings     None        Device (Oxygen Therapy): nasal cannula       48.2 kg (106 lb 4.2 oz); Body mass index is 18.24 kg/m².      Intake/Output Summary (Last 24 hours) at 18 0929  Last data filed at 18 0908   Gross per 24 hour   Intake             6958 ml   Output              725 ml   Net             6233 ml       Exam:    GEN:  Elderly female in mild resp distress, lethargic, slow to follow commands  EYES:   EOM-i, anicteric sclera bilat  ENT:    External ears/nose normal, OP clear/ moist mucosa  NECK:  Supple, midline trachea, No JVD or cervical LApathy  LUNGS: Bilateral breath sounds heard, B/l rhonchi and upper airway sounds  CV:  Regular rate and rhythm, No murmur  ABD:  diffusely tender, mildly distended, no palpable liver or masses  EXT:  No cyanosis or clubbing, no peripheral/sacral edema    Scheduled meds:      cefepime 2 g Intravenous Q24H   hydrocortisone sodium succinate 100 mg Intravenous Q8H   Vancomycin Pharmacy Intermittent Dosing  Does not apply Daily       IV meds:                          dextrose 150 mL/hr Last Rate: 150 mL/hr (18 1332)    norepinephrine 0.02-0.3 mcg/kg/min Last Rate: Stopped (09/16/18 0948)   Pharmacy to dose vancomycin         Data Review:        Results from last 7 days  Lab Units 09/17/18  0414 09/16/18  1727 09/16/18  1102 09/16/18  0735 09/16/18  0617 09/15/18  2319 09/15/18  1834   SODIUM mmol/L 161* 167* 170*  --  170* 172* 173*   POTASSIUM mmol/L 3.4*  --   --   --  4.0 4.8 4.5   CHLORIDE mmol/L 131*  --   --   --  135* 129* 125*   CO2 mmol/L 22.3  --   --   --  22.2 28.1 30.5*   BUN mg/dL 51*  --   --   --  70* 85* 80*   CREATININE mg/dL 1.37*  --   --   --  2.09* 2.60* 2.47*   CALCIUM mg/dL 6.8*  --   --   --  6.7* 8.3* 8.4*   BILIRUBIN mg/dL  --   --   --   --   --  0.5  --    ALK PHOS U/L  --   --   --   --   --  74  --    ALT (SGPT) U/L  --   --   --   --   --  22  --    AST (SGOT) U/L  --   --   --   --   --  26  --    GLUCOSE mg/dL 237*  --   --   --  93 84 73   WBC 10*3/mm3  --   --   --  19.33*  --  19.80* 19.42*   HEMOGLOBIN g/dL  --   --   --  11.3*  --  12.6 13.3   PLATELETS 10*3/mm3  --   --   --  142  --  142 163   INR   --   --   --   --   --  1.86*  --    PROBNP pg/mL  --   --   --   --   --  4,442.0*  --    PROCALCITONIN ng/mL  --   --   --   --   --  0.71*  --        Lab Results   Component Value Date    CALCIUM 6.8 (L) 09/17/2018    PHOS 1.9 (L) 09/17/2018         Results from last 7 days  Lab Units 09/16/18  0002 09/15/18  2328   BLOODCX  No growth at 24 hours No growth at 24 hours         Results from last 7 days  Lab Units 09/16/18  1102 09/16/18  0617 09/15/18  2319   TROPONIN T ng/mL 0.119* 0.112* 0.106*       Results Review:    I have reviewed the available laboratory results and reviewed the chest imaging personally    Imaging Results (all)     Procedure Component Value Units Date/Time    XR Chest 1 View [[id number]] Collected:  09/15/18 2337     Updated:  09/15/18 2341    Narrative:       PORTABLE CHEST X-RAY     HISTORY: soa     COMPARISON: None.     FINDINGS: Portable AP view of the chest was  obtained. Patient is rotated  to the left. Lungs are under aerated but grossly  clear where  visualized.  There are calcified residua of granulomatous disease  present. Heart size and pulmonary vasculature are likely normal  considering poor inspiration.   No obvious significant pleural fluid  collections. Extensive vascular calculi. Generalized osteopenia. Chronic  deformity of the proximal left humerus noted          Impression:          Poor inspiration without active disease identified,     This report was finalized on 9/15/2018 11:38 PM by Lei Gillis M.D.               ASSESSMENT:   Sepsis ? Source - pna vs GI  Hypernatremia, severe  Acute renal failure  Severe dehydration  ? NSTEMI type 2  Acute metabolic encephalopathy   Advanced dementia  History of COPD  History of dysphagia  DNR    PLAN:  Pt is still critically ill.   S/p 6L IVF with improvement in u/o and renal function. Off pressors now/  We will continue with close monitoring of sodium given severe hypernatremia.   Renal on board and started D5W. Expected to improve slowly. We will check for overcorrection.  KUB shows non-specific bowel gas pattern. US shows no concern for biliary process. Cannot completely r/o cholecystitis. Low threshold for gen surg consult. Will rpt KUB today.   Will trend as troponin not peaked yet. No cp reported. EKG shows no acute changes.   Will keep NPO while pt is altered. DHT and slow Tfs   C/w emperic abx. mrsa screen and dc vanc in AM if neg.      DNR/ No invasive procedures - [place]  DVT ppx     CC - 35 mins    I have discussed my findings and recommendations with patient, family and consulting provider.     Kasi Lindsay MD  9/17/2018

## 2018-09-17 NOTE — DISCHARGE PLACEMENT REQUEST
"Kaykay Chin  (89 y.o. Female)     Date of Birth Social Security Number Address Home Phone MRN    03/06/1929  [facility]  [place] [address] [facility] [id number] [phone] [id number]    Jehovah's witness Marital Status          [place]        Admission Date Admission Type Admitting Provider Attending Provider Department, Room/Bed    9/15/18 Emergency Ata Spain MD Nidadavolu, Vinay Gopal, MD [facility] INTENSIVE CARE, I374/1    Discharge Date Discharge Disposition Discharge Destination                       Attending Provider:  Kasi Lindsay MD    Allergies:  Bactrim [Sulfamethoxazole-trimethoprim]    Isolation:  None   Infection:  None   Code Status:  No CPR    Ht:  162.6 cm (64\")   Wt:  48.2 kg (106 lb 4.2 oz)    Admission Cmt:  None   Principal Problem:  None                Active Insurance as of 9/15/2018     Primary Coverage     Payor Plan Insurance Group Employer/Plan Group    MEDICARE MEDICARE A & B      Payor Plan Address Payor Plan Phone Number Effective From Effective To    [address] [phone] 9/1/1990     [facility] [id number]       Subscriber Name Subscriber Birth Date Member ID       KAYKAY CHIN 3/6/1929 [id number]           Secondary Coverage     Payor Plan Insurance Group Employer/Plan Group    [facility] SUPP [id number]     Payor Plan Address Payor Plan Phone Number Effective From Effective To    [address]  12/1/2016     [facility] [id number]       Subscriber Name Subscriber Birth Date Member ID       KAYKAY CHIN 3/6/1929 [id number]                 Emergency Contacts      (Rel.) Home Phone Work Phone Mobile Phone    Laura Boothe ([employer] [facility]) (Guardian) -- [phone] --    [place]([employer] Decisions) (Daughter) [phone] -- --            "

## 2018-09-17 NOTE — PROGRESS NOTES
"   LOS: 1 day   Patient Care Team:  Lars Ambriz MD as PCP - General (Family Medicine)  Zaida Mcdowell APRN as PCP - Claims Attributed    Chief Complaint/ Reason for encounter: Acute renal failure, hypernatremia        Subjective     Medical history reviewed:  History of Present Illness    Subjective:  Symptoms:  Stable.  She reports weakness.  No shortness of breath or chest pain.  (No new complaints or events noted  Remains pleasantly confused, currently not on any drips or pressors).    Diet:  Poor intake.    Activity level: Impaired due to weakness.    Pain:  She reports no pain.          History taken from: Patient and chart    Objective     Vital Signs  Temp:  [97.4 °F (36.3 °C)-98.7 °F (37.1 °C)] 98.2 °F (36.8 °C)  Heart Rate:  [63-96] 76  BP: ()/(46-83) 102/61       Wt Readings from Last 1 Encounters:   09/17/18 0702 48.2 kg (106 lb 4.2 oz)   09/16/18 0203 48.4 kg (106 lb 11.2 oz)   09/15/18 2331 49.4 kg (109 lb)       Objective:  General Appearance:  Comfortable, in no acute distress and not in pain.    Vital signs: (most recent): Blood pressure 102/61, pulse 76, temperature 98.2 °F (36.8 °C), temperature source Oral, resp. rate 19, height 162.6 cm (64\"), weight 48.2 kg (106 lb 4.2 oz), SpO2 97 %.  Vital signs are normal.  No fever.    Output: Producing urine.    HEENT: Normal HEENT exam.    Lungs:  Normal effort and normal respiratory rate.  Breath sounds clear to auscultation.  She is not in respiratory distress.  There are decreased breath sounds.    Heart: Normal rate.  Regular rhythm.    Abdomen: Abdomen is soft.  Bowel sounds are normal.   There is no abdominal tenderness.     Extremities: Normal range of motion.  There is no deformity or dependent edema.    Pulses: Distal pulses are intact.    Neurological: Patient is alert.    Skin:  Warm and dry.  No rash or cyanosis.             Results Review:    Intake/Output:     Intake/Output Summary (Last 24 hours) at 09/17/18 0956  Last " data filed at 09/17/18 0908   Gross per 24 hour   Intake             6958 ml   Output              725 ml   Net             6233 ml         DATA:  Radiology and Labs:  The following labs independently reviewed by me. Additional labs ordered for tomorrow a.m.  Interval notes, chart personally reviewed by me.   Old records independently reviewed showing no prior chronic kidney disease or hypernatremia prior labs from [facility]  The following radiologic studies independently viewed by me, findings abdominal ultrasound showed small kidneys, 1 cm cyst but no obstruction  New problems include hypokalemia  Discussed with pt      Risk/ complexity of medical care/ medical decision making high, severe electrolyte abnormalities requiring close monitoring of sodium level to avoid overcorrection, need for continued ICU care      Labs:   Recent Results (from the past 24 hour(s))   Troponin    Collection Time: 09/16/18 11:02 AM   Result Value Ref Range    Troponin T 0.119 (C) 0.000 - 0.030 ng/mL   Sodium    Collection Time: 09/16/18 11:02 AM   Result Value Ref Range    Sodium 170 (C) 136 - 145 mmol/L   POC Glucose Once    Collection Time: 09/16/18 11:05 AM   Result Value Ref Range    Glucose 90 70 - 130 mg/dL   Osmolality, Urine - Urine, Clean Catch    Collection Time: 09/16/18  1:38 PM   Result Value Ref Range    Osmolality, Urine 603 mOsm/kg   Vancomycin, Trough    Collection Time: 09/16/18  1:39 PM   Result Value Ref Range    Vancomycin Trough 12.60 5.00 - 20.00 mcg/mL   Sodium    Collection Time: 09/16/18  5:27 PM   Result Value Ref Range    Sodium 167 (C) 136 - 145 mmol/L   POC Glucose Once    Collection Time: 09/16/18  8:32 PM   Result Value Ref Range    Glucose 200 (H) 70 - 130 mg/dL   POC Glucose Once    Collection Time: 09/16/18 11:27 PM   Result Value Ref Range    Glucose 223 (H) 70 - 130 mg/dL   POC Glucose Once    Collection Time: 09/17/18  3:37 AM   Result Value Ref Range    Glucose 224 (H) 70 - 130 mg/dL   Basic  Metabolic Panel    Collection Time: 09/17/18  4:14 AM   Result Value Ref Range    Glucose 237 (H) 65 - 99 mg/dL    BUN 51 (H) 8 - 23 mg/dL    Creatinine 1.37 (H) 0.57 - 1.00 mg/dL    Sodium 161 (C) 136 - 145 mmol/L    Potassium 3.4 (L) 3.5 - 5.2 mmol/L    Chloride 131 (H) 98 - 107 mmol/L    CO2 22.3 22.0 - 29.0 mmol/L    Calcium 6.8 (L) 8.6 - 10.5 mg/dL    eGFR Non African Amer 36 (L) >60 mL/min/1.73    BUN/Creatinine Ratio 37.2 (H) 7.0 - 25.0    Anion Gap 7.7 mmol/L   Magnesium    Collection Time: 09/17/18  4:14 AM   Result Value Ref Range    Magnesium 2.3 1.6 - 2.4 mg/dL   Phosphorus    Collection Time: 09/17/18  4:14 AM   Result Value Ref Range    Phosphorus 1.9 (L) 2.5 - 4.5 mg/dL   POC Glucose Once    Collection Time: 09/17/18  7:24 AM   Result Value Ref Range    Glucose 192 (H) 70 - 130 mg/dL   Vancomycin, Random    Collection Time: 09/17/18  7:28 AM   Result Value Ref Range    Vancomycin Random 23.00 5.00 - 40.00 mcg/mL       Radiology:  Imaging Results (last 24 hours)     Procedure Component Value Units Date/Time    US Abdomen Complete [[id number]] Collected:  09/16/18 1235     Updated:  09/16/18 1243    Narrative:       US ABDOMEN COMPLETE-     INDICATIONS: Abdominal pain     TECHNIQUE: Ultrasound of the abdomen     COMPARISON: None available     FINDINGS:     Gallstones are demonstrated in the gallbladder. No gallbladder  tenderness was noted. No gallbladder wall thickening or pericholecystic  fluid.     No intrahepatic or extrahepatic biliary ductal dilatation is  demonstrated. The common biliary duct caliber is measured at 8 mm, that  may be normal for patient age.     No liver lesion is identified.     The pancreas is partly obscured.      The kidneys are echogenic. Apparent 1 cm cyst at the right lower pole  kidney. The right kidney, 9.8 cm, left kidney, 8.8 cm, otherwise appear  unremarkable.     The spleen, 7.2 cm, appears unremarkable.     Atherosclerotic disease noted in the aorta. Otherwise  unremarkable  appearance of the visualized aorta, inferior vena cava.          Impression:          Cholelithiasis. No evidence for acute cholecystitis. With persistent  clinical indication, hepatobiliary scintigraphy and/or CT scan could be  considered for further evaluation.           This report was finalized on 9/16/2018 12:40 PM by Dr. Junior Shields M.D.                Medications have been reviewed:  Current Facility-Administered Medications   Medication Dose Route Frequency Provider Last Rate Last Dose   • cefepime (MAXIPIME) 2 g/100 mL 0.9% NS (mbp)  2 g Intravenous Q24H Ata Spian MD   2 g at 09/17/18 0100   • dextrose (D5W) 5 % infusion  150 mL/hr Intravenous Continuous Arun Gilmore  mL/hr at 09/16/18 1332 150 mL/hr at 09/16/18 1332   • hydrocortisone sodium succinate (Solu-CORTEF) injection 50 mg  50 mg Intravenous Q8H Kasi Lindsay MD       • norepinephrine (LEVOPHED) 8 mg/250 mL (32 mcg/mL) in sodium chloride 0.9% infusion (premix)  0.02-0.3 mcg/kg/min Intravenous Titrated Ata Spain MD   Stopped at 09/16/18 0948   • Pharmacy to dose vancomycin   Does not apply Continuous PRN Ata Spain MD       • potassium phosphate 30 mmol in sodium chloride 0.9 % 500 mL infusion  30 mmol Intravenous Once Kasi Lindsay MD       • sodium chloride 0.9 % flush 1-10 mL  1-10 mL Intravenous PRN Ata Spain MD       • sodium chloride 0.9 % flush 10 mL  10 mL Intravenous PRN Jam Bryson MD       • sodium chloride 0.9 % flush 10 mL  10 mL Intravenous PRN Jam Bryson MD       • Vancomycin Pharmacy Intermittent Dosing   Does not apply Daily Ata Spain MD           ASSESSMENT:  Acute renal failure, prerenal, improved  Severe hypernatremia, slowly correcting at an acceptable rate  Severe dehydration  New hypokalemia  New hypophosphatemia  Severe hypotension, improved today  Sepsis syndrome/septic shock  Lactic acidosis  Severe dementia  DNR  status  Dysphagia, family declines feeding tube      Plan:  Renal function and hypernatremia continued to correct  Patient remains DNR, family declines feeding tube which is appropriate given her advanced dementia  Replace potassium and phosphorus IV x1  Continue to monitor serial sodium levels  Monitor vancomycin levels closely  Poor prognosis given her advanced dementia and severe dysphagia, consider palliative care consult        Lam Cabrera MD   [facility]   Office phone number: [phone]  Answering service phone number: [phone]    09/17/18  9:56 AM      Dictation performed using Dragon dictation software

## 2018-09-17 NOTE — PROGRESS NOTES
Adult Nutrition  Assessment/PES    Patient Name:  Kaykay Cedeno  YOB: 1929  MRN: [id number]  Admit Date:  9/15/2018    Assessment Date:  9/17/2018    Comments:  Nutrition assessment complete due to skin issues, low BMI and NPO status. Plan of care pending regarding pt wishes for feeding tube. Will follow.          Reason for Assessment     Row Name 09/17/18 1210          Reason for Assessment    Reason For Assessment identified at risk by screening criteria     Diagnosis neurologic conditions;metabolic state   Dementia, hypernatremia, ARF, Sepsis     Identified At Risk by Screening Criteria large or nonhealing wound, burn or pressure injury;BMI             Nutrition/Diet History     Row Name 09/17/18 1211          Nutrition/Diet History    Factors Affecting Nutritional Intake difficulty/impaired swallowing;impaired cognitive status/motor control             Anthropometrics     Row Name 09/17/18 1211 09/17/18 0702       Anthropometrics    Weight  -- 48.2 kg (106 lb 4.2 oz)       Admit Weight    Admit Weight 48.2 kg (106 lb 4.2 oz)  --       Body Mass Index (BMI)    BMI Assessment BMI 18.5-24.9: normal  --            Labs/Tests/Procedures/Meds     Row Name 09/17/18 1212          Labs/Procedures/Meds    Lab Results Reviewed reviewed, pertinent     Lab Results Comments Na, BUN, Cr, K, Glu, Phos        Diagnostic Tests/Procedures    Diagnostic Test/Procedure Reviewed reviewed, pertinent     Diagnostic Test/Procedures Comments CT ABd        Medications    Pertinent Medications Reviewed reviewed, pertinent     Pertinent Medications Comments steroids, insulin, Abx, Kphos, levo, IVF             Physical Findings     Row Name 09/17/18 1213          Physical Findings    Overall Physical Appearance underweight     Skin pressure injury   coccyx, ankle             Estimated/Assessed Needs     Row Name 09/17/18 1215          Estimated/Assessed Needs    Additional Documentation --   [id number] Kcals (25-30/kg), 48-57g  pro (1-1.2/kg)             Nutrition Prescription Ordered     Row Name 09/17/18 1215          Nutrition Prescription PO    Current PO Diet NPO             Evaluation of Received Nutrient/Fluid Intake     Row Name 09/17/18 1215          Fluid Intake Evaluation    IV Fluid (mL) 2400       Problem/Interventions:        Problem 1     Row Name 09/17/18 1215          Nutrition Diagnoses Problem 1    Problem 1 Inadequate Nutrient Intake     Signs/Symptoms (evidenced by) NPO               Intervention Goal     Row Name 09/17/18 1217          Intervention Goal    General Maintain nutrition     PO Initiate feeding;Tolerate PO     Weight No significant weight loss             Nutrition Intervention     Row Name 09/17/18 1217          Nutrition Intervention    RD/Tech Action Follow Tx progress;Care plan reviewd;Await begin PO               Education/Evaluation     Row Name 09/17/18 1217          Education    Education Education not appropriate at this time     Please explain Patient confusion        Monitor/Evaluation    Monitor Per protocol;Symptoms;I&O;PO intake;Pertinent labs;Weight;Skin status         Electronically signed by:  Brittny Pandey RD  09/17/18 12:53 PM

## 2018-09-17 NOTE — SIGNIFICANT NOTE
09/17/18 0948   Rehab Time/Intention   Evaluation Not Performed other (see comments)  (Discussion with RN, pt dependent at baseline and not appropriate for acute OT. Per RN OT to sign off at this time.)   Rehab Treatment   Discipline occupational therapist

## 2018-09-17 NOTE — PROGRESS NOTES
Discharge Planning Assessment  [facility]     Patient Name: Kaykay Cedeno  MRN: [id number]  Today's Date: 9/17/2018    Admit Date: 9/15/2018          Discharge Needs Assessment     Row Name 09/17/18 1310       Living Environment    Lives With facility resident    Current Living Arrangements extended care facility    Primary Care Provided by other (see comments)    Family Caregiver if Needed none    Family Caregiver Names Pt has guardian thru [employer]    Able to Return to Prior Arrangements other (see comments)       Transition Planning    Patient/Family Anticipates Transition to long term care facility    Transportation Anticipated health plan transportation       Discharge Needs Assessment    Concerns to be Addressed decision making;discharge planning    Discharge Facility/Level of Care Needs nursing facility, intermediate    Current Discharge Risk chronically ill            Discharge Plan     Row Name 09/17/18 1304       Plan    Plan Return back to [facility] pending confirmation with [facility] Guardian    Patient/Family in Agreement with Plan other (see comments)    Plan Comments Attempted to screen, no guardian at bedside. CCP called pts johnathan Boothe ([phone]) left vm requesting call back. CCP called [facility] ([phone]) and left  requesting bed status and level of care at facility. Alejandro Kay ([phone]) from [email or web address] is a  from [employer] to follow up on patient. Alejandro stated to call [email or web address] [phone] during normal business hours for consent and treatments and to call [phone] after hours. He states it will come up as [facility] and to ask for [email or web address] on Call  and they will call CCP back. CCP directed Alejandro to medical records. Await call back from Johnathan Boothe on dc plan. CCP to follow. miriam cisneros/ccp        Destination     No service coordination in this encounter.      Durable Medical Equipment     No service  coordination in this encounter.      Dialysis/Infusion     No service coordination in this encounter.      Home Medical Care     No service coordination in this encounter.      Social Care     No service coordination in this encounter.                Demographic Summary     Row Name 09/17/18 130       General Information    Admission Type inpatient    Referral Source admission list    Reason for Consult discharge planning    Preferred Language English     Used During This Interaction no       Contact Information    Permission Granted to Share Info With facility ;guardian    Contact Information Comments Pt has guardian thru [employer]            Functional Status     Row Name 09/17/18 1300       Functional Status    Functional Status Comments unable to assess       Functional Status, IADL    Medications completely dependent    Meal Preparation completely dependent    Housekeeping completely dependent    Laundry completely dependent    Shopping completely dependent    IADL Comments pt is from facility.       Mental Status Summary    Recent Changes in Mental Status/Cognitive Functioning unable to assess            Psychosocial    No documentation.           Abuse/Neglect    No documentation.           Legal    No documentation.           Substance Abuse    No documentation.           Patient Forms    No documentation.         Radha Skelton RN

## 2018-09-18 NOTE — THERAPY EVALUATION
Acute Care - Speech Language Pathology   Swallow Initial Evaluation [facility]     Patient Name: Kaykay Cedeno  : 3/6/1929  MRN: [id number]  Today's Date: 2018               Admit Date: 9/15/2018  Clinical Evaluation Summary: Patient with severe confusion, oral holding, delayed initiation of swallow, delayed laryngeal elevation, audible gurgly swallow with thin, coughing/choking on thin, coughing on nectar. Appeared to tolerate puree and honey thick liquids without difficulty. Recommend: puree;honey thick liquids;ice chips between meals after oral care, with supervision. Precautions: upright posture during/after eating;small bites of food and sips of liquid; slow rate, must be assisted or fed. Monitor intake to ensure adequacy  Visit Dx:     ICD-10-CM ICD-9-CM   1. Sepsis, due to unspecified organism (CMS/[facility]) A41.9 038.9     995.91   2. Hypernatremia E87.0 276.0   3. Acute renal failure, unspecified acute renal failure type (CMS/[facility]) N17.9 584.9   4. Septic shock (CMS/[facility]) A41.9 038.9    R65.21 785.52     995.92   5. Leukocytosis, unspecified type D72.829 288.60   6. Dehydration E86.0 276.51   7. Oropharyngeal dysphagia R13.12 787.22     Patient Active Problem List   Diagnosis   • Sepsis (CMS/[facility])   • Hypophosphatasia     Past Medical History:   Diagnosis Date   • Abnormal posture    • Alzheimer disease    • Anxiety disorder    • Asthma    • Chronic pain    • Chronic pain syndrome    • Constipation    • COPD (chronic obstructive pulmonary disease) (CMS/[facility])    • Dementia    • Disease of thyroid gland    • Dysphagia, oropharyngeal phase    • GERD (gastroesophageal reflux disease)    • Heart failure (CMS/[facility])    • Hemorrhoids    • Hyperlipidemia    • Hypertension    • Hypokalemia    • Osteoporosis    • Pneumonia    • Spondylosis without myelopathy or radiculopathy, site unspecified    • Unspecified osteoarthritis, unspecified site      History reviewed. No pertinent surgical history.       SWALLOW EVALUATION  (last 72 hours)      SLP Adult Swallow Evaluation     Row Name 09/18/18 1500                   Rehab Evaluation    Document Type evaluation  -MT        Subjective Information no complaints  -MT        Patient Observations alert;cooperative  -MT        Patient Effort fair  -MT        Comment Very confused, but pleasant  -MT           General Information    Patient Profile Reviewed yes  -MT        Pertinent History Of Current Problem dehydration, hypernatremia, sepsis  -MT        Current Method of Nutrition NPO  -MT        Precautions/Limitations, Vision WFL;for purposes of eval  -MT        Precautions/Limitations, Hearing WFL;for purposes of eval  -MT        Prior Level of Function-Communication cognitive-linguistic impairment  -MT        Prior Level of Function-Swallowing puree;honey thick liquids  -MT        Plans/Goals Discussed with patient;family  -MT        Barriers to Rehab cognitive status  -MT        Patient's Goals for Discharge patient could not state  -MT        Family Goals for Discharge patient able to return to PO diet  -MT           Oral Motor and Function    Dentition Assessment edentulous, does not have dentures  -MT        Secretion Management WNL/WFL  -MT        Mucosal Quality moist, healthy  -MT        Volitional Swallow unable to elicit  -MT        Volitional Cough unable to elicit  -MT           Oral Musculature and Cranial Nerve Assessment    Oral Motor General Assessment generalized oral motor weakness  -MT           Clinical Swallow Eval    Oral Prep Phase impaired  -MT        Oral Transit impaired  -MT        Oral Residue WFL  -MT        Pharyngeal Phase suspected pharyngeal impairment  -MT        Clinical Swallow Evaluation Summary Patient with severe confusion, oral holding, delayed initiation of swallow, delayed laryngeal elevation, audible gurgly swallow with thin, coughing/choking on thin, coughing on nectar. Appeared to tolerate puree and honey thick liquids without difficulty.   -MT            Oral Prep Concerns    Oral Prep Concerns oral holding;increased prep time  -MT        Oral Holding all consistencies  -MT        Increased Prep Time all consistencies  -MT           Oral Transit Concerns    Oral Transit Concerns increased oral transit time;delayed initiation of bolus transit  -MT        Delayed Intiation of Bolus Transit all consistencies  -MT        Increased Oral Transit Time all consistencies  -MT           Pharyngeal Phase Concerns    Pharyngeal Phase Concerns cough  -MT        Cough thin;nectar  -MT           Clinical Impression    SLP Swallowing Diagnosis oral dysfunction;suspected pharyngeal dysfunction;other (see comments)   Cognition/confusion impacts swallow function  -MT        Functional Impact risk of aspiration/pneumonia;risk of malnutrition;risk of dehydration  -MT        Rehab Potential/Prognosis, Swallowing adequate, monitor progress closely  -MT        Swallow Criteria for Skilled Therapeutic Interventions Met not appropriate   Needs close monitoring but does not require skilled therapy  -MT           Recommendations    Therapy Frequency (Swallow) evaluation only  -MT        SLP Diet Recommendation puree;honey thick liquids;ice chips between meals after oral care, with supervision  -MT        Recommended Precautions and Strategies upright posture during/after eating;small bites of food and sips of liquid;other (see comments)   slow rate, must be assisted or fed  -MT        Monitor for Signs of Aspiration yes;cough;elevated WBC count;right lower lobe infiltrates  -MT        Anticipated Dischage Disposition [facility] nursing [facility]  -MT          User Key  (r) = Recorded By, (t) = Taken By, (c) = Cosigned By    Initials Name Effective Dates    Liz Bella MS CCC-SLP 06/08/18 -         EDUCATION  The patient has been educated in the following areas:   Dysphagia (Swallowing Impairment) Oral Care/Hydration Modified Diet Instruction.    SLP Recommendation and Plan  SLP  Swallowing Diagnosis: oral dysfunction, suspected pharyngeal dysfunction, other (see comments) (Cognition/confusion impacts swallow function)  SLP Diet Recommendation: puree, honey thick liquids, ice chips between meals after oral care, with supervision  Recommended Precautions and Strategies: upright posture during/after eating, small bites of food and sips of liquid, other (see comments) (slow rate, must be assisted or fed)  Monitor for Signs of Aspiration: yes, cough, elevated WBC count, right lower lobe infiltrates  Swallow Criteria for Skilled Therapeutic Interventions Met: not appropriate (Needs close monitoring but does not require skilled therapy)  Anticipated Dischage Disposition: skilled nursing facility  Rehab Potential/Prognosis, Swallowing: adequate, monitor progress closely  Therapy Frequency (Swallow): evaluation only  Plan of Care Reviewed With: patient, daughter  Plan of Care Review  Plan of Care Reviewed With: patient, daughter  Progress: improving  Outcome Summary: Patient had Clinical Swallow Evaluation this date. Tolerated puree foods and honey thick liquids. Must be fed. Precautions: Upright, small bites and sips, slow rate. Watch for patient to swallow before giving next bite or sip. Monitor intake. Pills crushed or whole in puree           SLP Outcome Measures (last 72 hours)      SLP Outcome Measures     Row Name 09/18/18 1500             SLP Outcome Measures    Outcome Measure Used? Adult NOMS  -MT         Adult FCM Scores    FCM Chosen Swallowing  -MT      Swallowing FCM Score 3  -MT        User Key  (r) = Recorded By, (t) = Taken By, (c) = Cosigned By    Initials Name Effective Dates    MT JesuLiz, MS CCC-SLP 06/08/18 -            Time Calculation:         Time Calculation- SLP     Row Name 09/18/18 1526             Time Calculation- SLP    SLP Start Time 1430  -MT      SLP Stop Time 1526  -MT      SLP Time Calculation (min) 56 min  -MT      SLP Received On 09/18/18  -MT         User Key  (r) = Recorded By, (t) = Taken By, (c) = Cosigned By    Initials Name Provider Type    Liz Bella MS CCC-SLP Speech and Language Pathologist          Therapy Charges for Today     Code Description Service Date Service Provider Modifiers Qty    [id number]  ST EVAL ORAL PHARYNG SWALLOW 4 9/18/2018 Liz Nails MS CCC-SLP GN 1               Liz Nails MS CCC-ALIA  9/18/2018

## 2018-09-18 NOTE — PROGRESS NOTES
"Pharmacokinetic Evaluation - Vancomycin    Kaykay Cedeno is a 89 y.o. female on vancomycin pharmacy to dose.  MRN: [id number]  : 3/6/1929    Day of vancomycin therapy: 3  Indication: sepsis  Consulted by: Dr. Spain  Goal trough: 15-20    Other antimicrobials: cefepime 2 g q24h    Blood pressure (!) 142/102, pulse 70, temperature 98.4 °F (36.9 °C), temperature source Oral, resp. rate 22, height 162.6 cm (64\"), weight 58.1 kg (128 lb 1.6 oz), SpO2 90 %.    Results from last 7 days  Lab Units 18  0623 18  0414 18  0617   CREATININE mg/dL 0.96 1.37* 2.09*     Estimated Creatinine Clearance: 36.4 mL/min (by C-G formula based on SCr of 0.96 mg/dL).    Results from last 7 days  Lab Units 18  0623 18  1407 18  0735   WBC 10*3/mm3 11.83* 18.07* 19.33*   HEMOGLOBIN g/dL 10.7* 10.3* 11.3*   HEMATOCRIT % 36.2 35.4* 40.9   PLATELETS 10*3/mm3 129* 103* 142     Procal: 0.71 on 9/15    Radiology:   9/15 CXR: \"Poor inspiration without active disease identified\"   US abdomen: \"Cholelithiasis. No evidence for acute cholecystitis. With persistent clinical indication, hepatobiliary scintigraphy and/or CT scan could be considered for further evaluation.\"    Cultures:   9/15 blood cx: NGTD x2   MRSA: neg      Dosing hx (include troughs if drawn):   vanc 1 g x1 @0152   vanc 1 g x1 @1607   vanc random = 23.0 @0728   vanc random = 21.3 @21.3   vanc random = 17.1 @0623    Assessment:  Random level is therapeutic.  Renal function is improved. Patient had MANNY with a SCr high of 2.6 on 9/15.  SCr improved at 0.96. Plan to continue with pulsed vanc dosing for now. Re-evaluate if patient is appropriate for scheduled dosing in future.    Plan:  1) Give vancomycin 500 mg IV x1 dose today.  2) Obtain vanc random  am.  3) Monitor for signs/symptoms of renal insufficiency, including SCr and UOP.    Lucho Bryson RPH   18 10:33 AM     "

## 2018-09-18 NOTE — SIGNIFICANT NOTE
09/18/18 1446   Rehab Time/Intention   Evaluation Not Performed other (see comments)  (Patient on bowel rest - check in a.m. 9/19)   Rehab Treatment   Discipline speech language pathologist

## 2018-09-18 NOTE — PROGRESS NOTES
"   LOS: 2 days   Patient Care Team:  Lars Ambriz MD as PCP - General (Family Medicine)  Zaida Mcdowell APRN as PCP - Claims Attributed    Chief Complaint/ Reason for encounter: Acute renal failure, hypernatremia        Subjective     Medical history reviewed:  Altered Mental Status   Associated symptoms include weakness. Pertinent negatives include no chest pain or fever.       Subjective:  Symptoms:  Stable.  She reports weakness.  No shortness of breath or chest pain.  (Remains pleasantly confused, out of the ICU, remains nothing by mouth).    Diet:  Poor intake.    Activity level: Impaired due to weakness.    Pain:  She reports no pain.          History taken from: Patient and chart    Objective     Vital Signs  Temp:  [97.9 °F (36.6 °C)-98.4 °F (36.9 °C)] 98.3 °F (36.8 °C)  Heart Rate:  [70-93] 91  Resp:  [16-22] 16  BP: (100-142)/() 124/108       Wt Readings from Last 1 Encounters:   09/18/18 1156 52.2 kg (115 lb)   09/18/18 0539 58.1 kg (128 lb 1.6 oz)   09/17/18 0702 48.2 kg (106 lb 4.2 oz)   09/16/18 0203 48.4 kg (106 lb 11.2 oz)   09/15/18 2331 49.4 kg (109 lb)       Objective:  General Appearance:  Comfortable, in no acute distress and not in pain.    Vital signs: (most recent): Blood pressure (!) 124/108, pulse 91, temperature 98.3 °F (36.8 °C), temperature source Oral, resp. rate 16, height 162.6 cm (64\"), weight 52.2 kg (115 lb), SpO2 91 %.  Vital signs are normal.  No fever.    Output: Producing urine.    HEENT: Normal HEENT exam.    Lungs:  Normal effort and normal respiratory rate.  Breath sounds clear to auscultation.  She is not in respiratory distress.  There are decreased breath sounds.    Heart: Normal rate.  Regular rhythm.    Abdomen: Abdomen is soft.  Bowel sounds are normal.   There is no abdominal tenderness.     Extremities: Normal range of motion.  There is no deformity or dependent edema.    Pulses: Distal pulses are intact.    Neurological: Patient is alert.  "   Skin:  Warm and dry.  No rash or cyanosis.             Results Review:    Intake/Output:     Intake/Output Summary (Last 24 hours) at 09/18/18 1446  Last data filed at 09/18/18 0045   Gross per 24 hour   Intake                0 ml   Output              100 ml   Net             -100 ml         DATA:  Radiology and Labs:  The following labs independently reviewed by me. Additional labs ordered for tomorrow a.m.  Interval notes, chart personally reviewed by me.   Old records independently reviewed showing no prior chronic kidney disease or hypernatremia prior labs from [facility]      Labs:   Recent Results (from the past 24 hour(s))   POC Glucose Once    Collection Time: 09/17/18  5:47 PM   Result Value Ref Range    Glucose 137 (H) 70 - 130 mg/dL   Vancomycin, Random    Collection Time: 09/17/18  6:57 PM   Result Value Ref Range    Vancomycin Random 21.30 5.00 - 40.00 mcg/mL   POC Glucose Once    Collection Time: 09/17/18  8:33 PM   Result Value Ref Range    Glucose 145 (H) 70 - 130 mg/dL   POC Glucose Once    Collection Time: 09/18/18 12:42 AM   Result Value Ref Range    Glucose 141 (H) 70 - 130 mg/dL   Vancomycin, Random    Collection Time: 09/18/18  6:23 AM   Result Value Ref Range    Vancomycin Random 17.10 5.00 - 40.00 mcg/mL   Renal Function Panel    Collection Time: 09/18/18  6:23 AM   Result Value Ref Range    Glucose 155 (H) 65 - 99 mg/dL    BUN 35 (H) 8 - 23 mg/dL    Creatinine 0.96 0.57 - 1.00 mg/dL    Sodium 155 (H) 136 - 145 mmol/L    Potassium 3.3 (L) 3.5 - 5.2 mmol/L    Chloride 123 (H) 98 - 107 mmol/L    CO2 22.1 22.0 - 29.0 mmol/L    Calcium 6.8 (L) 8.6 - 10.5 mg/dL    Albumin 2.30 (L) 3.50 - 5.20 g/dL    Phosphorus 2.4 (L) 2.5 - 4.5 mg/dL    Anion Gap 9.9 mmol/L    BUN/Creatinine Ratio 36.5 (H) 7.0 - 25.0    eGFR Non African Amer 55 (L) >60 mL/min/1.73   CBC Auto Differential    Collection Time: 09/18/18  6:23 AM   Result Value Ref Range    WBC 11.83 (H) 4.50 - 10.70 10*3/mm3    RBC 3.56 (L) 3.90 -  5.20 10*6/mm3    Hemoglobin 10.7 (L) 11.9 - 15.5 g/dL    Hematocrit 36.2 35.6 - 45.5 %    .7 (H) 80.5 - 98.2 fL    MCH 30.1 26.9 - 32.0 pg    MCHC 29.6 (L) 32.4 - 36.3 g/dL    RDW 13.7 (H) 11.7 - 13.0 %    RDW-SD 50.4 37.0 - 54.0 fl    MPV 13.8 (H) 6.0 - 12.0 fL    Platelets 129 (L) 140 - 500 10*3/mm3    Neutrophil % 92.4 (H) 42.7 - 76.0 %    Lymphocyte % 4.6 (L) 19.6 - 45.3 %    Monocyte % 2.6 (L) 5.0 - 12.0 %    Eosinophil % 0.0 (L) 0.3 - 6.2 %    Basophil % 0.1 0.0 - 1.5 %    Immature Grans % 0.3 0.0 - 0.5 %    Neutrophils, Absolute 10.93 (H) 1.90 - 8.10 10*3/mm3    Lymphocytes, Absolute 0.54 (L) 0.90 - 4.80 10*3/mm3    Monocytes, Absolute 0.31 0.20 - 1.20 10*3/mm3    Eosinophils, Absolute 0.00 0.00 - 0.70 10*3/mm3    Basophils, Absolute 0.01 0.00 - 0.20 10*3/mm3    Immature Grans, Absolute 0.04 (H) 0.00 - 0.03 10*3/mm3   POC Glucose Once    Collection Time: 09/18/18  6:38 AM   Result Value Ref Range    Glucose 143 (H) 70 - 130 mg/dL   POC Glucose Once    Collection Time: 09/18/18 11:07 AM   Result Value Ref Range    Glucose 136 (H) 70 - 130 mg/dL       Radiology:  Imaging Results (last 24 hours)     ** No results found for the last 24 hours. **             Medications have been reviewed:  Current Facility-Administered Medications   Medication Dose Route Frequency Provider Last Rate Last Dose   • calcium gluconate 2 g in sodium chloride 0.9 % 100 mL IVPB  2 g Intravenous Q4H Lam Cabrera MD   2 g at 09/18/18 1104   • cefepime (MAXIPIME) 2 g/100 mL 0.9% NS (mbp)  2 g Intravenous Q24H Ata Spain MD   2 g at 09/18/18 0301   • dextrose (D50W) 25 g/ 50mL Intravenous Solution 25 g  25 g Intravenous Q15 Min PRN Kasi Lindsay MD       • dextrose (D5W) 5 % infusion  125 mL/hr Intravenous Continuous Lam Cabrera  mL/hr at 09/18/18 0846 125 mL/hr at 09/18/18 0846   • dextrose (GLUTOSE) oral gel 15 g  15 g Oral Q15 Min PRN Kasi Lindsay MD       • glucagon (human  recombinant) (GLUCAGEN DIAGNOSTIC) injection 1 mg  1 mg Subcutaneous PRN Kasi Lindsay MD       • hydrocortisone sodium succinate (Solu-CORTEF) injection 50 mg  50 mg Intravenous Q8H Kasi Lindsay MD   50 mg at 09/18/18 0642   • insulin aspart (novoLOG) injection 0-7 Units  0-7 Units Subcutaneous Q6H Kasi Lindsay MD       • ipratropium-albuterol (DUO-NEB) nebulizer solution 3 mL  3 mL Nebulization Q4H PRN Scout Colbert MD   3 mL at 09/18/18 0805   • Pharmacy to dose vancomycin   Does not apply Continuous PRN Ata Spain MD       • sodium chloride 0.9 % flush 1-10 mL  1-10 mL Intravenous PRN Ata Spain MD       • sodium chloride 0.9 % flush 10 mL  10 mL Intravenous PRN Jam Bryson MD       • sodium chloride 0.9 % flush 10 mL  10 mL Intravenous PRN Jam Bryson MD       • vancomycin 500 mg/100 mL 0.9% NS IVPB (mbp)  500 mg Intravenous Once Ata Spain MD       • Vancomycin Pharmacy Intermittent Dosing   Does not apply Daily Ata Spain MD           ASSESSMENT:  Acute renal failure, prerenal, improved  Severe hypernatremia, slowly correcting at an acceptable rate though appears to have plateaued a bit  Severe dehydration  hypokalemia  hypophosphatemia  hypotension, improved   Sepsis syndrome/septic shock  Lactic acidosis  Severe dementia  DNR status  Dysphagia, family declines feeding tube      Plan:  Renal function and hypernatremia continued to correct  Patient remains DNR, family declines feeding tube which is appropriate given her advanced dementia  Replace potassium and phosphorus IV x1 again today  Continue to monitor serial sodium levels  Speech to evaluate regarding safety in resuming diet  If she is unable to maintain oral hydration and nutrition than palliative care would be most appropriate at this point        Lam Cabrera MD   [facility]   Office phone number: [phone]  Answering service phone number:  [phone]    09/18/18  2:46 PM      Dictation performed using Dragon dictation software

## 2018-09-18 NOTE — PLAN OF CARE
Problem: Patient Care Overview  Goal: Plan of Care Review   09/18/18 1523   Coping/Psychosocial   Plan of Care Reviewed With patient;daughter   Plan of Care Review   Progress improving   OTHER   Outcome Summary Patient had Clinical Swallow Evaluation this date. Tolerated puree foods and honey thick liquids. Must be fed. Precautions: Upright, small bites and sips, slow rate. Watch for patient to swallow before giving next bite or sip. Monitor intake. Pills crushed or whole in puree

## 2018-09-18 NOTE — PLAN OF CARE
Problem: Skin Injury Risk (Adult)  Goal: Identify Related Risk Factors and Signs and Symptoms  Outcome: Outcome(s) achieved Date Met: 09/18/18    Goal: Skin Health and Integrity  Outcome: Ongoing (interventions implemented as appropriate)      Problem: Fall Risk (Adult)  Goal: Identify Related Risk Factors and Signs and Symptoms  Outcome: Outcome(s) achieved Date Met: 09/18/18    Goal: Absence of Fall  Outcome: Ongoing (interventions implemented as appropriate)      Problem: Patient Care Overview  Goal: Plan of Care Review  Outcome: Ongoing (interventions implemented as appropriate)   09/18/18 0657   Coping/Psychosocial   Plan of Care Reviewed With patient   Plan of Care Review   Progress no change   OTHER   Outcome Summary pt admitted to floor from ICU this shift; no c/o soa, n/v from family; pt alert to self; Q2H turn; IVF cont; IV antbx cont; purewick placed w/output; NPO for speech re-eval today; no acute distress noted; will continue to monitor        Problem: Sepsis/Septic Shock (Adult)  Goal: Signs and Symptoms of Listed Potential Problems Will be Absent, Minimized or Managed (Sepsis/Septic Shock)  Outcome: Ongoing (interventions implemented as appropriate)

## 2018-09-18 NOTE — PLAN OF CARE
Problem: Patient Care Overview  Goal: Plan of Care Review  Outcome: Ongoing (interventions implemented as appropriate)   09/18/18 9384   Coping/Psychosocial   Plan of Care Reviewed With patient   Plan of Care Review   Progress no change   OTHER   Outcome Summary patient alert follows commands confused but pleasant, iv fluids infusing, antibx and electrolytes given iv. family aware of situation speech reeval due to patient more alert. diet ordered, feeder, turned q2h. no acute distress noted will contnue to monitori

## 2018-09-18 NOTE — ACP (ADVANCE CARE PLANNING)
I was requested to visit patient regarding goals of care.  Patient has an Advance Directive on chart and at this time it has been documented that she is a No CPR patient.  I had a brief conversation.  She stated she was [employer] and we also had prayer together.

## 2018-09-18 NOTE — PROGRESS NOTES
Kasi Lindsay MD                          [phone]      Patient ID:    Name:  Kaykay Cedeno    MRN:  [id number]    3/6/1929   89 y.o.  female            Patient Care Team:  Lars Ambriz MD as PCP - General (Family Medicine)  Zaida Mcdowell APRN as PCP - Claims Attributed    CC/Reason for visit:     Subjective: Pt seen and examined this AM. No acute overnight events noted. Doing the same. Sodium improved. Off pressors. Still very lethargic and follows commands after multiple attempts.     ROS:   Cannot obtain     Objective     Vital Signs past 24hrs    BP range: BP: (100-142)/() 124/108  Pulse range: Heart Rate:  [70-93] 91  Resp rate range: Resp:  [16-22] 16  Temp range: Temp (24hrs), Av.2 °F (36.8 °C), Min:97.9 °F (36.6 °C), Max:98.4 °F (36.9 °C)    Ventilator/Non-Invasive Ventilation Settings     None        Device (Oxygen Therapy): nasal cannula       52.2 kg (115 lb); Body mass index is 19.74 kg/m².      Intake/Output Summary (Last 24 hours) at 18 1634  Last data filed at 18 0045   Gross per 24 hour   Intake                0 ml   Output              100 ml   Net             -100 ml       Exam:    GEN:  Elderly female in mild resp distress, lethargic, slow to follow commands  EYES:   EOM-i, anicteric sclera bilat  ENT:    External ears/nose normal, OP clear/ moist mucosa  NECK:  Supple, midline trachea, No JVD or cervical LApathy  LUNGS: Bilateral breath sounds heard, B/l rhonchi and upper airway sounds  CV:  Regular rate and rhythm, No murmur  ABD:  diffusely tender, mildly distended, no palpable liver or masses  EXT:  No cyanosis or clubbing, no peripheral/sacral edema    Scheduled meds:      calcium gluconate 2 g Intravenous Q4H   cefepime 2 g Intravenous Q24H   hydrocortisone sodium succinate 50 mg Intravenous Q8H   insulin aspart 0-7 Units Subcutaneous Q6H   vancomycin 500 mg Intravenous Once   Vancomycin Pharmacy Intermittent Dosing   Does not apply Daily       IV meds:                          dextrose 125 mL/hr Last Rate: 125 mL/hr (09/18/18 0846)   Pharmacy to dose vancomycin         Data Review:        Results from last 7 days  Lab Units 09/18/18  0623 09/17/18  1407 09/17/18  0728 09/17/18  0414  09/16/18  0735 09/16/18  0617 09/15/18  2319   SODIUM mmol/L 155* 154* 163* 161*  < >  --  170* 172*   POTASSIUM mmol/L 3.3*  --   --  3.4*  --   --  4.0 4.8   CHLORIDE mmol/L 123*  --   --  131*  --   --  135* 129*   CO2 mmol/L 22.1  --   --  22.3  --   --  22.2 28.1   BUN mg/dL 35*  --   --  51*  --   --  70* 85*   CREATININE mg/dL 0.96  --   --  1.37*  --   --  2.09* 2.60*   CALCIUM mg/dL 6.8*  --   --  6.8*  --   --  6.7* 8.3*   BILIRUBIN mg/dL  --   --   --   --   --   --   --  0.5   ALK PHOS U/L  --   --   --   --   --   --   --  74   ALT (SGPT) U/L  --   --   --   --   --   --   --  22   AST (SGOT) U/L  --   --   --   --   --   --   --  26   GLUCOSE mg/dL 155*  --   --  237*  --   --  93 84   WBC 10*3/mm3 11.83* 18.07*  --   --   --  19.33*  --  19.80*   HEMOGLOBIN g/dL 10.7* 10.3*  --   --   --  11.3*  --  12.6   PLATELETS 10*3/mm3 129* 103*  --   --   --  142  --  142   INR   --   --   --   --   --   --   --  1.86*   PROBNP pg/mL  --   --   --   --   --   --   --  4,442.0*   PROCALCITONIN ng/mL  --   --   --   --   --   --   --  0.71*   < > = values in this interval not displayed.    Lab Results   Component Value Date    CALCIUM 6.8 (L) 09/18/2018    PHOS 2.4 (L) 09/18/2018         Results from last 7 days  Lab Units 09/17/18  1353 09/16/18  0002 09/15/18  2328   BLOODCX   --  No growth at 2 days No growth at 2 days   MRSA SCREEN CX  No Methicillin Resistant Staphylococcus aureus isolated  --   --          Results from last 7 days  Lab Units 09/17/18  0728 09/16/18  1102 09/16/18  0617 09/15/18  2319   TROPONIN T ng/mL 0.097* 0.119* 0.112* 0.106*       Results Review:    I have reviewed the available laboratory results and reviewed the  chest imaging personally    Imaging Results (all)     Procedure Component Value Units Date/Time    XR Chest 1 View [[id number]] Collected:  09/15/18 2337     Updated:  09/15/18 2341    Narrative:       PORTABLE CHEST X-RAY     HISTORY: soa     COMPARISON: None.     FINDINGS: Portable AP view of the chest was obtained. Patient is rotated  to the left. Lungs are under aerated but grossly  clear where  visualized.  There are calcified residua of granulomatous disease  present. Heart size and pulmonary vasculature are likely normal  considering poor inspiration.   No obvious significant pleural fluid  collections. Extensive vascular calculi. Generalized osteopenia. Chronic  deformity of the proximal left humerus noted          Impression:          Poor inspiration without active disease identified,     This report was finalized on 9/15/2018 11:38 PM by Lei Gillis M.D.               ASSESSMENT:   Sepsis ? Source - pna vs GI  Hypernatremia, severe  Acute renal failure  Severe dehydration  ? NSTEMI type 2  Acute metabolic encephalopathy   Advanced dementia  History of COPD  History of dysphagia  DNR    PLAN:  Sodium and MS much better but pt still slow. Wean steroids.   We will continue with close monitoring of sodium given severe hypernatremia.   Renal on board   KUB shows non-specific bowel gas pattern. US shows no concern for biliary process. Cannot completely r/o cholecystitis. Rpt shows better.   EKG shows no acute changes. Trop down now.   No artificial feeds per guardian. Will reconsult SLP.   C/w emperic abx. mrsa screen and dc vanc in AM if neg.      DNR/ No invasive procedures - [place]. Unclear goals of care. Will get palliative to see her again.   DVT ppx       I have discussed my findings and recommendations with patient, family and consulting provider.     Kasi Lindsay MD  9/18/2018

## 2018-09-18 NOTE — PLAN OF CARE
Problem: Skin Injury Risk (Adult)  Intervention: Promote/Optimize Nutrition   09/18/18 0227   Nutrition Interventions   Oral Nutrition Promotion calorie dense liquids provided; adding Magic Cups TID to help promote intake and wound healing; recommend addition of MVI with minerals to also promote wound healing

## 2018-09-18 NOTE — CONSULTS
Adult Nutrition  Assessment/PES    Patient Name:  Kaykay Cedeno  YOB: 1929  MRN: [id number]  Admit Date:  9/15/2018    Assessment Date:  9/18/2018    Comments:  Consult d/t MST score of 5 per nurse admission screen.  Patient also with DTI to coccyx and bilateral ankle.  No TFs per patient wishes per chart.  Has guardian.  [place].    Alert to self only per chart report.  S/p SLP evaluation this afternoon.  Now on pureed diet with HTL, has to be fed.  Adding Magic Cups TID to provide increased kcal and protein intake and to help promote wound healing.    Will continue to follow.          Reason for Assessment     Row Name 09/18/18 1545          Reason for Assessment    Reason For Assessment identified at risk by screening criteria;nurse/nurse practitioner consult     Identified At Risk by Screening Criteria MST SCORE 2+;large or nonhealing wound, burn or pressure injury             Nutrition/Diet History     Row Name 09/18/18 1545          Nutrition/Diet History    Typical Food/Fluid Intake passed SLP eval today for HTL/pureed, has to be fed             Anthropometrics     Row Name 09/18/18 1546 09/18/18 1156       Anthropometrics    Weight  -- 52.2 kg (115 lb)       Admit Weight    Admit Weight --   115# 9/18  --       Body Mass Index (BMI)    BMI Assessment BMI 18.5-24.9: normal  --            Labs/Tests/Procedures/Meds     Row Name 09/18/18 1546          Labs/Procedures/Meds    Lab Results Reviewed reviewed, pertinent        Diagnostic Tests/Procedures    Diagnostic Test/Procedure Reviewed reviewed, pertinent     Diagnostic Test/Procedures Comments passed SLP evaluation today for pureed/HTL        Medications    Pertinent Medications Reviewed reviewed, pertinent             Physical Findings     Row Name 09/18/18 1546          Physical Findings    Skin pressure injury   coccyx DTI, bilateral ankle DTI, B=10               Nutrition Prescription Ordered     Row Name 09/18/18 1544          Nutrition  Prescription PO    Current PO Diet Dysphagia     Dysphagia Level 2  Pureed     Fluid Consistency Honey thick   no straws             Evaluation of Received Nutrient/Fluid Intake     Row Name 09/18/18 1547          PO Evaluation    Number of Days PO Intake Evaluated Insufficient Data             Problem/Interventions:          Problem 2     Row Name 09/18/18 1547          Nutrition Diagnoses Problem 2    Problem 2 Increased Nutrient Needs     Macronutrient Kcal;Protein     Etiology (related to) Medical Diagnosis     Skin Pressure injury     Signs/Symptoms (evidenced by) Report/Observation                   Intervention Goal     Row Name 09/18/18 1547          Intervention Goal    General Maintain nutrition;Reduce/improve symptoms;Disease management/therapy;Meet nutritional needs for age/condition     PO Establish PO;Tolerate PO;PO intake (%)     PO Intake % 75 %     Weight No significant weight loss             Nutrition Intervention     Row Name 09/18/18 1547          Nutrition Intervention    RD/Tech Action Follow Tx progress;Care plan reviewd;Recommend/ordered     Recommended/Ordered Supplement             Nutrition Prescription     Row Name 09/18/18 1548          Nutrition Prescription PO    PO Prescription Begin/change supplement     Supplement Magic Cup     Supplement Frequency 3 times a day     New PO Prescription Ordered? Yes             Education/Evaluation     Row Name 09/18/18 1548          Education    Education Education not appropriate at this time     Please explain Patient confusion        Monitor/Evaluation    Monitor Per protocol;PO intake;Supplement intake;Pertinent labs;Weight;Skin status;Symptoms         Electronically signed by:  Negra Croft RD  09/18/18 3:48 PM

## 2018-09-19 NOTE — PROGRESS NOTES
Kasi Lindsay MD                          [phone]      Patient ID:    Name:  Kaykay Cedeno    MRN:  [id number]    3/6/1929   89 y.o.  female            Patient Care Team:  Lars Ambriz MD as PCP - General (Family Medicine)  Zaida Mcdowell APRN as PCP - Claims Attributed    CC/Reason for visit:     Subjective: Pt seen and examined this AM. No acute overnight events noted. Doing the same. Sodium improved. Some improvement in MS.     ROS:   Cannot obtain     Objective     Vital Signs past 24hrs    BP range: BP: (133-158)/(81-92) 158/92  Pulse range: Heart Rate:  [81-90] 81  Resp rate range: Resp:  [16-19] 16  Temp range: Temp (24hrs), Av.1 °F (36.7 °C), Min:97.9 °F (36.6 °C), Max:98.6 °F (37 °C)    Ventilator/Non-Invasive Ventilation Settings     None        Device (Oxygen Therapy): nasal cannula       56.7 kg (125 lb); Body mass index is 21.46 kg/m².      Intake/Output Summary (Last 24 hours) at 18 1550  Last data filed at 18 0800   Gross per 24 hour   Intake              170 ml   Output                0 ml   Net              170 ml       Exam:    GEN:  Elderly female in no resp distress, lethargic, slow to follow commands  EYES:   EOM-i, anicteric sclera bilat  ENT:    External ears/nose normal, OP clear/ moist mucosa  NECK:  Supple, midline trachea, No JVD or cervical LApathy  LUNGS: Bilateral breath sounds heard, B/l rhonchi and upper airway sounds  CV:  Regular rate and rhythm, No murmur  ABD:  diffusely tender, mildly distended, no palpable liver or masses  EXT:  No cyanosis or clubbing, no peripheral/sacral edema    Scheduled meds:      cefepime 2 g Intravenous Q12H       IV meds:                             Data Review:        Results from last 7 days  Lab Units 18  0534 18  0623 18  1407  18  0414  09/15/18  2319   SODIUM mmol/L 156* 155* 154*  < > 161*  < > 172*   POTASSIUM mmol/L 3.4* 3.3*  --   --  3.4*  < > 4.8    CHLORIDE mmol/L 125* 123*  --   --  131*  < > 129*   CO2 mmol/L 21.1* 22.1  --   --  22.3  < > 28.1   BUN mg/dL 23 35*  --   --  51*  < > 85*   CREATININE mg/dL 1.03* 0.96  --   --  1.37*  < > 2.60*   CALCIUM mg/dL 8.8 6.8*  --   --  6.8*  < > 8.3*   BILIRUBIN mg/dL  --   --   --   --   --   --  0.5   ALK PHOS U/L  --   --   --   --   --   --  74   ALT (SGPT) U/L  --   --   --   --   --   --  22   AST (SGOT) U/L  --   --   --   --   --   --  26   GLUCOSE mg/dL 132* 155*  --   --  237*  < > 84   WBC 10*3/mm3 10.73* 11.83* 18.07*  --   --   < > 19.80*   HEMOGLOBIN g/dL 11.1* 10.7* 10.3*  --   --   < > 12.6   PLATELETS 10*3/mm3 127* 129* 103*  --   --   < > 142   INR   --   --   --   --   --   --  1.86*   PROBNP pg/mL  --   --   --   --   --   --  4,442.0*   PROCALCITONIN ng/mL  --   --   --   --   --   --  0.71*   < > = values in this interval not displayed.    Lab Results   Component Value Date    CALCIUM 8.8 09/19/2018    PHOS 3.1 09/19/2018         Results from last 7 days  Lab Units 09/17/18  1353 09/16/18  0002 09/15/18  2328   BLOODCX   --  No growth at 3 days No growth at 3 days   MRSA SCREEN CX  No Methicillin Resistant Staphylococcus aureus isolated  --   --          Results from last 7 days  Lab Units 09/17/18  0728 09/16/18  1102 09/16/18  0617 09/15/18  2319   TROPONIN T ng/mL 0.097* 0.119* 0.112* 0.106*       Results Review:    I have reviewed the available laboratory results and reviewed the chest imaging personally    Imaging Results (all)     Procedure Component Value Units Date/Time    XR Chest 1 View [[id number]] Collected:  09/15/18 2337     Updated:  09/15/18 2341    Narrative:       PORTABLE CHEST X-RAY     HISTORY: soa     COMPARISON: None.     FINDINGS: Portable AP view of the chest was obtained. Patient is rotated  to the left. Lungs are under aerated but grossly  clear where  visualized.  There are calcified residua of granulomatous disease  present. Heart size and pulmonary vasculature are  likely normal  considering poor inspiration.   No obvious significant pleural fluid  collections. Extensive vascular calculi. Generalized osteopenia. Chronic  deformity of the proximal left humerus noted          Impression:          Poor inspiration without active disease identified,     This report was finalized on 9/15/2018 11:38 PM by Lei Gillis M.D.               ASSESSMENT:   Sepsis; resolved  Suspected pna   Hypernatremia, severe  Acute renal failure  Severe dehydration  ? NSTEMI type 2  Acute metabolic encephalopathy   Advanced dementia  History of COPD  History of dysphagia  DNR    PLAN:  Sodium and MS much better but pt still slow.   Palliative care d/w guardian and wanted us to respect her wishes.   Will switch her to comfort care now as she has not made progress and is not taking any PO.   Will dc IVF and other meds except abx and nebs.   Tx to [facility]/ Comfort measures Only    Might need inpatient hospice.     > 30 mins spent in the care of the pt involving coordinating care and palliative care planning.     I have discussed my findings and recommendations with patient, family and consulting provider.     Kasi Lindsay MD  9/19/2018

## 2018-09-19 NOTE — PLAN OF CARE
Problem: Patient Care Overview  Goal: Plan of Care Review  Outcome: Ongoing (interventions implemented as appropriate)   09/19/18 2898   Coping/Psychosocial   Plan of Care Reviewed With patient   Plan of Care Review   Progress no change   OTHER   Outcome Summary Pt rested throughout day. Denies pain, SOA, N/V. Diarrhea today, MD aware, no new orderes. Turned. Transfer orders obtained. F/C placed. Weaned from 2L to RA at tolerating. No acute signs of distress noted. Monitored closely

## 2018-09-19 NOTE — PROGRESS NOTES
"   LOS: 3 days   Patient Care Team:  Lars Ambriz MD as PCP - General (Family Medicine)  Zaida Mcdowell APRN as PCP - Claims Attributed    Chief Complaint/ Reason for encounter: Acute renal failure, hypernatremia        Subjective     Medical history reviewed:  Altered Mental Status   Associated symptoms include weakness. Pertinent negatives include no chest pain or fever.       Subjective:  Symptoms:  Stable.  She reports weakness.  No shortness of breath or chest pain.  (Nonverbal, plan is for transition to palliative care discussed with her nurse).    Diet:  Poor intake.    Activity level: Impaired due to weakness.    Pain:  She reports no pain.          History taken from: Patient and chart    Objective     Vital Signs  Temp:  [97.9 °F (36.6 °C)-98.6 °F (37 °C)] 98.6 °F (37 °C)  Heart Rate:  [81-90] 81  Resp:  [16-19] 16  BP: (133-158)/(81-92) 158/92       Wt Readings from Last 1 Encounters:   09/19/18 0500 56.7 kg (125 lb)   09/18/18 1156 52.2 kg (115 lb)   09/18/18 0539 58.1 kg (128 lb 1.6 oz)   09/17/18 0702 48.2 kg (106 lb 4.2 oz)   09/16/18 0203 48.4 kg (106 lb 11.2 oz)   09/15/18 2331 49.4 kg (109 lb)       Objective:  General Appearance:  Comfortable, in no acute distress and not in pain.    Vital signs: (most recent): Blood pressure 158/92, pulse 81, temperature 98.6 °F (37 °C), temperature source Oral, resp. rate 16, height 162.6 cm (64\"), weight 56.7 kg (125 lb), SpO2 96 %.  Vital signs are normal.  No fever.    Output: Producing urine.    HEENT: Normal HEENT exam.    Lungs:  Normal effort and normal respiratory rate.  Breath sounds clear to auscultation.  She is not in respiratory distress.  There are decreased breath sounds.    Heart: Normal rate.  Regular rhythm.    Abdomen: Abdomen is soft.  Bowel sounds are normal.   There is no abdominal tenderness.     Extremities: Normal range of motion.  There is no deformity or dependent edema.    Pulses: Distal pulses are intact.  "   Neurological: (Confused, disoriented).    Skin:  Warm and dry.  No rash or cyanosis.             Results Review:    Intake/Output:     Intake/Output Summary (Last 24 hours) at 09/19/18 1529  Last data filed at 09/19/18 0800   Gross per 24 hour   Intake              170 ml   Output                0 ml   Net              170 ml         DATA:  Radiology and Labs:  The following labs independently reviewed by me. Additional labs ordered for tomorrow a.m.  Interval notes, chart personally reviewed by me.   Old records independently reviewed showing no prior chronic kidney disease or hypernatremia [facility] from [facility]      Labs:   Recent Results (from the past 24 hour(s))   POC Glucose Once    Collection Time: 09/18/18  8:28 PM   Result Value Ref Range    Glucose 103 70 - 130 mg/dL   Renal Function Panel    Collection Time: 09/19/18  5:34 AM   Result Value Ref Range    Glucose 132 (H) 65 - 99 mg/dL    BUN 23 8 - 23 mg/dL    Creatinine 1.03 (H) 0.57 - 1.00 mg/dL    Sodium 156 (H) 136 - 145 mmol/L    Potassium 3.4 (L) 3.5 - 5.2 mmol/L    Chloride 125 (H) 98 - 107 mmol/L    CO2 21.1 (L) 22.0 - 29.0 mmol/L    Calcium 8.8 8.6 - 10.5 mg/dL    Albumin 2.50 (L) 3.50 - 5.20 g/dL    Phosphorus 3.1 2.5 - 4.5 mg/dL    Anion Gap 9.9 mmol/L    BUN/Creatinine Ratio 22.3 7.0 - 25.0    eGFR Non African Amer 50 (L) >60 mL/min/1.73   Magnesium    Collection Time: 09/19/18  5:34 AM   Result Value Ref Range    Magnesium 2.3 1.6 - 2.4 mg/dL   Vancomycin, Random    Collection Time: 09/19/18  5:34 AM   Result Value Ref Range    Vancomycin Random 16.60 5.00 - 40.00 mcg/mL   CBC Auto Differential    Collection Time: 09/19/18  5:34 AM   Result Value Ref Range    WBC 10.73 (H) 4.50 - 10.70 10*3/mm3    RBC 3.66 (L) 3.90 - 5.20 10*6/mm3    Hemoglobin 11.1 (L) 11.9 - 15.5 g/dL    Hematocrit 37.0 35.6 - 45.5 %    .1 (H) 80.5 - 98.2 fL    MCH 30.3 26.9 - 32.0 pg    MCHC 30.0 (L) 32.4 - 36.3 g/dL    RDW 13.5 (H) 11.7 - 13.0 %    RDW-SD 49.2  37.0 - 54.0 fl    MPV 13.8 (H) 6.0 - 12.0 fL    Platelets 127 (L) 140 - 500 10*3/mm3    Neutrophil % 93.1 (H) 42.7 - 76.0 %    Lymphocyte % 4.3 (L) 19.6 - 45.3 %    Monocyte % 2.1 (L) 5.0 - 12.0 %    Eosinophil % 0.1 (L) 0.3 - 6.2 %    Basophil % 0.1 0.0 - 1.5 %    Immature Grans % 0.3 0.0 - 0.5 %    Neutrophils, Absolute 10.00 (H) 1.90 - 8.10 10*3/mm3    Lymphocytes, Absolute 0.46 (L) 0.90 - 4.80 10*3/mm3    Monocytes, Absolute 0.22 0.20 - 1.20 10*3/mm3    Eosinophils, Absolute 0.01 0.00 - 0.70 10*3/mm3    Basophils, Absolute 0.01 0.00 - 0.20 10*3/mm3    Immature Grans, Absolute 0.03 0.00 - 0.03 10*3/mm3   POC Glucose Once    Collection Time: 09/19/18  6:15 AM   Result Value Ref Range    Glucose 130 70 - 130 mg/dL   POC Glucose Once    Collection Time: 09/19/18 12:07 PM   Result Value Ref Range    Glucose 156 (H) 70 - 130 mg/dL       Radiology:  Imaging Results (last 24 hours)     ** No results found for the last 24 hours. **             Medications have been reviewed:  Current Facility-Administered Medications   Medication Dose Route Frequency Provider Last Rate Last Dose   • cefepime (MAXIPIME) 2 g/100 mL 0.9% NS (mbp)  2 g Intravenous Q12H Kasi Lindsay MD   2 g at 09/19/18 1509   • dextrose (D50W) 25 g/ 50mL Intravenous Solution 25 g  25 g Intravenous Q15 Min PRN Kasi Lindsay MD       • dextrose (D5W) 5 % infusion  200 mL/hr Intravenous Continuous Lam Cabrera  mL/hr at 09/19/18 1014 200 mL/hr at 09/19/18 1014   • dextrose (GLUTOSE) oral gel 15 g  15 g Oral Q15 Min PRN Kasi Lindsay MD       • glucagon (human recombinant) (GLUCAGEN DIAGNOSTIC) injection 1 mg  1 mg Subcutaneous PRN Kasi Lindsay MD       • hydrocortisone sodium succinate (Solu-CORTEF) injection 25 mg  25 mg Intravenous Q12H Kasi Lindsay MD   25 mg at 09/19/18 0200   • insulin aspart (novoLOG) injection 0-7 Units  0-7 Units Subcutaneous Q6H Kasi Lindsay MD       •  ipratropium-albuterol (DUO-NEB) nebulizer solution 3 mL  3 mL Nebulization Q4H PRN Scout Colbert MD   3 mL at 09/18/18 0805   • potassium chloride (MICRO-K) CR capsule 40 mEq  40 mEq Oral PRN Kasi Lindsay MD        Or   • potassium chloride (KLOR-CON) packet 40 mEq  40 mEq Oral PRN Kasi Lindsay MD        Or   • potassium chloride 10 mEq in 100 mL IVPB  10 mEq Intravenous Q1H PRN Kasi Lindsay  mL/hr at 09/19/18 1205 10 mEq at 09/19/18 1205   • sodium chloride 0.9 % flush 1-10 mL  1-10 mL Intravenous PRN Ata Spain MD       • sodium chloride 0.9 % flush 10 mL  10 mL Intravenous PRN Jam Bryson MD       • sodium chloride 0.9 % flush 10 mL  10 mL Intravenous PRN Jam Bryson MD           ASSESSMENT:  Acute renal failure, prerenal, improved  Severe hypernatremia, slowly correcting at an acceptable rate though appears to have plateaued a bit  Severe dehydration  hypokalemia  hypophosphatemia  hypotension, improved   Sepsis syndrome/septic shock  Lactic acidosis  Severe dementia  DNR status  Dysphagia, family declines feeding tube      Plan:  Renal function and hypernatremia are slowly better but her oral intake remains low so there is no good endpoint, high aspiration risk per speech evaluation  Agree with plans for transition to palliative care with focus on comfort  Can discontinue IV fluids and potassium once patient is transferred to the palliative care service  Patient remains DNR, family declines feeding tube which is appropriate given her advanced dementia        Lam Cabrera MD   [facility]   Office phone number: [phone]  Answering service phone number: [phone]    09/19/18  3:29 PM      Dictation performed using Dragon dictation software

## 2018-09-19 NOTE — PROGRESS NOTES
"Pharmacokinetic Consult - Vancomycin Dosing (Follow-up Note)    Kaykay Cedeno is a 89 y.o. female who is on day 4 pharmacy to dose vancomycin for sepsis.  Pharmacy dosing vancomycin per Dr. Spain's request.   Other antimicrobials: Cefepime 2 gram IV q24h (adj. to q12h given improved renal function)  Goal trough: 15-20 mg/L    Current Vancomycin dose: intermittent    Relevant clinical data and objective history reviewed:  162.6 cm (64\")  56.7 kg (125 lb)  Body mass index is 21.46 kg/m².     She has a past medical history of Abnormal posture; Alzheimer disease; Anxiety disorder; Asthma; Chronic pain; Chronic pain syndrome; Constipation; COPD (chronic obstructive pulmonary disease) (CMS/HCC); Dementia; Disease of thyroid gland; Dysphagia, oropharyngeal phase; GERD (gastroesophageal reflux disease); Heart failure (CMS/[facility]); Hemorrhoids; Hyperlipidemia; Hypertension; Hypokalemia; Osteoporosis; Pneumonia; Spondylosis without myelopathy or radiculopathy, site unspecified; and Unspecified osteoarthritis, unspecified site.    Allergies as of 09/15/2018 - Reviewed 09/15/2018   Allergen Reaction Noted   • Bactrim [sulfamethoxazole-trimethoprim] Unknown (See Comments) 09/15/2018     Vital Signs (last 24 hours)       09/18 0700  -  09/19 0659 09/19 0700  -  09/19 0801   Most Recent    Temp (°F) 97.9 -  98.4      97.9     97.9 (36.6)    Heart Rate 70 -  91       83    Resp 16 -  22      19     19    BP (!) 124/108 -  (!) 142/102       133/83    SpO2 (%) 90 -  93       92        Estimated Creatinine Clearance: 33.1 mL/min (A) (by C-G formula based on SCr of 1.03 mg/dL (H)).    Results from last 7 days  Lab Units 09/19/18  0534 09/18/18  0623 09/17/18  0414   CREATININE mg/dL 1.03* 0.96 1.37*       Results from last 7 days  Lab Units 09/19/18  0534 09/18/18  0623 09/17/18  1407   WBC 10*3/mm3 10.73* 11.83* 18.07*     Baseline culture/source/susceptibility:   9/15 BC NG3d  9/17 MRSA nares screen preliminary ng24h     Radiology: " "  9/15 CXR: \"Poor inspiration without active disease identified\"  9/16 US abdomen: \"Cholelithiasis. No evidence for acute cholecystitis. With persistent clinical indication, hepatobiliary scintigraphy and/or CT scan could be considered for further evaluation.\"    Labs:  Procal: 0.71 on 9/15    Dosing hx (include troughs if drawn):  9/16 vanc 1 g x1 @ 0152  9/16 vanc random @ 1339 = 12.6  9/16 vanc 1 g x1 @ 1607  9/17 vanc random = 23.0 @ 0728  9/17 vanc random = 21.3 @ 1857  9/18 vanc random = 17.1 @ 0623  9/18 vanc 500mg x1 @ 1300  9/19 vanc random = 16.6 @ 0534 (~16 hr level)       Lab Results   Component Value Date    VANCOTROUGH 12.60 09/16/2018     Lab Results   Component Value Date    VANCORANDOM 16.60 09/19/2018     Assessment/Plan:  Random level is therapeutic.  Renal function is improved. Patient had MANNY with a SCr high of 2.6 on 9/15.    1) Vancomycin 500mg IV x1 (~9mg/kg) dose again today to keep random level > 15 mcg/mL.  Patient previously cleared Vancomycin slowly with a Kelim of 0.019 and T1/2 of 36h.    2) Daily renal function panel on order  3) Encourage hydration as allowed by MD to help prevent toxic accumulation; monitor for s/sxn of toxicity including increase in SCr and decrease in UOP.    Pharmacy will continue to follow daily while on vancomycin and adjust as needed.     Thanks, Jan Rodriguez, PharmD, BCPS    "

## 2018-09-19 NOTE — CONSULTS
Purpose of the visit was to evaluate for: hospice referral/discussion, pain/symptom management and transfer to comfort care bed/unit. Spoke with MD and RN as well as other Court Appointed Guardian and discussed palliative care, goals of care and resuscitation status.      Assessment:  Patient is palliative care appropriate for inpatient care given (list diagnosis/symptoms):  History of advanced dementia, COPD, dysphagia, osteoporosis, chronic pain, and anxiety disorder.  Patient was admitted with sepsis and altered mental status.  Patient does not c/o pain or dyspnea, is occasionally restless and agitated.  PPS 30%      Recommendations/Plan: transfer to [facility] for palliative care.    Other Comments: Spoke with patient's court appointed guardian regarding goals of care, palliative care, the palliative care unit, hospice care, and the patient's advance directive.  The patient has a living will directive on file stating she directs no life prolonging treatment and all treatment be given to relieve pain and provide comfort.  The guardian stated they wish to honor this living will and will fax a statement attesting to this.  He wishes to continue antibiotics and any other treatment that could help 's comfort and quality, but not to pursue any aggressive measures.  I will discuss with the physician and transfer patient to the palliative care unit.  We will follow for any further needs.  Thank you.

## 2018-09-19 NOTE — PROGRESS NOTES
Continued Stay Note  [facility]     Patient Name: Kaykay Cedeno  MRN: [id number]  Today's Date: 9/19/2018    Admit Date: 9/15/2018          Discharge Plan     Row Name 09/19/18 1602       Plan    Plan Comments Per palliative RN progress note, pt to transfer to palliative care. Pt has orders to transfer to 4P palliative care. CCP to follow. JChasteenRN/CCP               Discharge Codes    No documentation.           Beth Martinez RN

## 2018-09-20 NOTE — PROGRESS NOTES
Discharge Planning Assessment  [facility]     Patient Name: Kaykay Cedeno  MRN: [id number]  Today's Date: 9/20/2018    Admit Date: 9/15/2018          Discharge Needs Assessment    No documentation.             Discharge Plan     Row Name 09/20/18 1406       Plan    Plan Comments The patient transferred to [facility] from [address] [place] [address] on 9/19/18 @ 17:50. The patient is palliative. No [facility] order at this time. CCP will follow for any needs that may arise. DESTIN Schmidt RN, [facility]    Row Name 09/20/18 1243       Plan    Plan Comments Received a call back from Nicolás/Yevgeniy Renee and he states the patient's daughter can have medical information about the patient but is not able to make any medical decisions. I requested a copy of this ruling to be faxed to [facility] so that I can scan into [facility]. Per Nicolás/ if any other questions he is available at [phone]. DESTIN Schmidt RN, [facility]    Row Name 09/20/18 7606       Plan    Plan Comments Received a call back from Nicolás with [employer] [facility] and he will verify if the daughter may have any medical information or able to speak to MD and asked questions. He will call [facility] back with the information today. DESTIN Schmidt RN, [facility].         Destination     Service Request Status Selected Specialties Address Phone Number Fax Number    [facility] Accepted N/A [address] [facility] [address] [phone]      Durable Medical Equipment     No service coordination in this encounter.      Dialysis/Infusion     No service coordination in this encounter.      Home Medical Care     No service coordination in this encounter.      Social Care     No service coordination in this encounter.                Demographic Summary    No documentation.           Functional Status    No documentation.           Psychosocial    No documentation.           Abuse/Neglect    No documentation.           Legal    No documentation.           Substance Abuse    No documentation.            Patient Forms    No documentation.         Maricarmen Schmidt RN

## 2018-09-20 NOTE — PROGRESS NOTES
Discharge Planning Assessment  [facility]     Patient Name: Kaykay Cedeno  MRN: [id number]  Today's Date: 9/20/2018    Admit Date: 9/15/2018          Discharge Needs Assessment    No documentation.             Discharge Plan     Row Name 09/20/18 1243       Plan    Plan Comments Received a call back from Nicolás/Yevgeniy Renee and he states the patient's daughter can have medical information about the patient but is not able to make any medical decisions. I requested a copy of this ruling to be faxed to [facility] so that I can scan into [facility]. Per Nicolás/ if any other questions he is available at [phone]. DESTIN Schmidt RN, [facility]    Row Name 09/20/18 3930       Plan    Plan Comments Received a call back from Nicolás with [employer] and he will verify if the daughter may have any medical information or able to speak to MD and asked questions. He will call [facility] back with the information today. DESTIN Schmidt RN, [facility].         Destination     Service Request Status Selected Specialties Address Phone Number Fax Number    [facility] Accepted N/A [address] [facility] [address] [phone]      Durable Medical Equipment     No service coordination in this encounter.      Dialysis/Infusion     No service coordination in this encounter.      Home Medical Care     No service coordination in this encounter.      Social Care     No service coordination in this encounter.                Demographic Summary    No documentation.           Functional Status    No documentation.           Psychosocial    No documentation.           Abuse/Neglect    No documentation.           Legal    No documentation.           Substance Abuse    No documentation.           Patient Forms    No documentation.         Maricarmen Schmidt RN

## 2018-09-20 NOTE — PLAN OF CARE
Problem: Skin Injury Risk (Adult)  Goal: Skin Health and Integrity  Outcome: Ongoing (interventions implemented as appropriate)      Problem: Fall Risk (Adult)  Goal: Absence of Fall  Outcome: Ongoing (interventions implemented as appropriate)      Problem: Patient Care Overview  Goal: Plan of Care Review  Outcome: Ongoing (interventions implemented as appropriate)   09/20/18 0434   Coping/Psychosocial   Plan of Care Reviewed With patient;guardian   Plan of Care Review   Progress declining   OTHER   Outcome Summary Pt medicated x1 for restlessness, x1 for SOA, tolerated well. Pt appears comfortable at this time. No family at bedside. Will continue to monitor per comfort care.      Goal: Individualization and Mutuality  Outcome: Ongoing (interventions implemented as appropriate)    Goal: Discharge Needs Assessment  Outcome: Ongoing (interventions implemented as appropriate)    Goal: Interprofessional Rounds/Family Conf  Outcome: Ongoing (interventions implemented as appropriate)      Problem: Dying Patient, Actively (Adult)  Goal: Identify Related Risk Factors and Signs and Symptoms  Outcome: Ongoing (interventions implemented as appropriate)    Goal: Comfort/Pain Control  Outcome: Ongoing (interventions implemented as appropriate)    Goal: Peace/Preservation of Dignity During the Dying Process  Outcome: Ongoing (interventions implemented as appropriate)

## 2018-09-20 NOTE — PROGRESS NOTES
Adult Nutrition  Assessment/PES    Patient Name:  Kaykay Cedeno  YOB: 1929  MRN: [id number]  Admit Date:  9/15/2018    Assessment Date:  9/20/2018    Comments:  Pt now palliative status.           Reason for Assessment     Row Name 09/20/18 1045          Reason for Assessment    Reason For Assessment follow-up protocol             Nutrition/Diet History     Row Name 09/20/18 1045          Nutrition/Diet History    Typical Food/Fluid Intake Moved to palliative for comfort care.                Labs/Tests/Procedures/Meds     Row Name 09/20/18 1046          Labs/Procedures/Meds    Lab Results Reviewed reviewed        Diagnostic Tests/Procedures    Diagnostic Test/Procedure Reviewed reviewed        Medications    Pertinent Medications Reviewed reviewed             Physical Findings     Row Name 09/20/18 1046          Physical Findings    Skin pressure injury   coccyx DTI, bilateral ankle DTI, B=10               Nutrition Prescription Ordered     Row Name 09/20/18 1047          Nutrition Prescription PO    Current PO Diet Dysphagia     Dysphagia Level 2  Pureed     Fluid Consistency Honey thick   no straws             Evaluation of Received Nutrient/Fluid Intake     Row Name 09/20/18 1048          PO Evaluation    Number of Days PO Intake Evaluated 2 days     % PO Intake <30%           Problem/Interventions:          Intervention Goal     Row Name 09/20/18 1049          Intervention Goal    General Palliative Care               Electronically signed by:  Jovita Dominguez RD  09/20/18 10:49 AM

## 2018-09-20 NOTE — PROGRESS NOTES
Discharge Planning Assessment  [facility]     Patient Name: Kaykay Cedeno  MRN: [id number]  Today's Date: 9/20/2018    Admit Date: 9/15/2018          Discharge Needs Assessment    No documentation.             Discharge Plan     Row Name 09/20/18 1139       Plan    Plan Comments Received a call back from Nicolás with [employer] and he will verify if the daughter may have any medical information or able to speak to MD and asked questions. He will call CCP back with the information today. DESTIN Schmidt RN, CCP.         Destination     Service Request Status Selected Specialties Address Phone Number Fax Number    [facility] Accepted N/A [address] [facility] [address] [phone]      Durable Medical Equipment     No service coordination in this encounter.      Dialysis/Infusion     No service coordination in this encounter.      Home Medical Care     No service coordination in this encounter.      Social Care     No service coordination in this encounter.                Demographic Summary    No documentation.           Functional Status    No documentation.           Psychosocial    No documentation.           Abuse/Neglect    No documentation.           Legal    No documentation.           Substance Abuse    No documentation.           Patient Forms    No documentation.         Maricarmen Schmidt RN

## 2018-09-21 NOTE — PLAN OF CARE
Problem: Patient Care Overview  Goal: Plan of Care Review  Outcome: Ongoing (interventions implemented as appropriate)   09/21/18 2250   Coping/Psychosocial   Plan of Care Reviewed With patient;family   Plan of Care Review   Progress declining   OTHER   Outcome Summary Per goals of care, pt medicated PRN to maintain comfort. Pt had an episode of SOA, improved with morphine. Ativan given for restlessness. Family at [facility].

## 2018-09-21 NOTE — PROGRESS NOTES
Kasi Lindsay MD                          [phone]      Patient ID:    Name:  Kaykay Cedeno    MRN:  [id number]    3/6/1929   89 y.o.  female            Patient Care Team:  Lars Ambriz MD as PCP - General (Family Medicine)  Zaida Mcdowell APRN as PCP - Claims Attributed    CC/Reason for visit:     Subjective: Pt seen and examined this AM. No acute overnight events noted. Doing the same. Appears comfortable.     ROS:   Cannot obtain     Objective     Vital Signs past 24hrs    BP range: BP: (151-162)/(88-90) 162/88  Pulse range: Heart Rate:  [70-83] 83  Resp rate range: Resp:  [16-18] 16  Temp range: Temp (24hrs), Av.9 °F (36.6 °C), Min:97.7 °F (36.5 °C), Max:98 °F (36.7 °C)    Ventilator/Non-Invasive Ventilation Settings     None        Device (Oxygen Therapy): room air       56.7 kg (125 lb); Body mass index is 21.46 kg/m².      Intake/Output Summary (Last 24 hours) at 18 1412  Last data filed at 18 0600   Gross per 24 hour   Intake                0 ml   Output              600 ml   Net             -600 ml       Exam:    GEN:  Elderly female in no resp distress, lethargic, slow to follow commands  EYES:   EOM-i, anicteric sclera bilat  ENT:    External ears/nose normal, OP clear/ moist mucosa  NECK:  Supple, midline trachea, No JVD or cervical LApathy  LUNGS: Bilateral breath sounds heard, B/l rhonchi and upper airway sounds  CV:  Regular rate and rhythm, No murmur  ABD:  diffusely tender, mildly distended, no palpable liver or masses  EXT:  No cyanosis or clubbing, no peripheral/sacral edema    Scheduled meds:      cefepime 2 g Intravenous Q12H       IV meds:                             Data Review:        Results from last 7 days  Lab Units 18  0534 18  0623 18  1407  18  0414  09/15/18  2319   SODIUM mmol/L 156* 155* 154*  < > 161*  < > 172*   POTASSIUM mmol/L 3.4* 3.3*  --   --  3.4*  < > 4.8   CHLORIDE mmol/L 125* 123*   --   --  131*  < > 129*   CO2 mmol/L 21.1* 22.1  --   --  22.3  < > 28.1   BUN mg/dL 23 35*  --   --  51*  < > 85*   CREATININE mg/dL 1.03* 0.96  --   --  1.37*  < > 2.60*   CALCIUM mg/dL 8.8 6.8*  --   --  6.8*  < > 8.3*   BILIRUBIN mg/dL  --   --   --   --   --   --  0.5   ALK PHOS U/L  --   --   --   --   --   --  74   ALT (SGPT) U/L  --   --   --   --   --   --  22   AST (SGOT) U/L  --   --   --   --   --   --  26   GLUCOSE mg/dL 132* 155*  --   --  237*  < > 84   WBC 10*3/mm3 10.73* 11.83* 18.07*  --   --   < > 19.80*   HEMOGLOBIN g/dL 11.1* 10.7* 10.3*  --   --   < > 12.6   PLATELETS 10*3/mm3 127* 129* 103*  --   --   < > 142   INR   --   --   --   --   --   --  1.86*   PROBNP pg/mL  --   --   --   --   --   --  4,442.0*   PROCALCITONIN ng/mL  --   --   --   --   --   --  0.71*   < > = values in this interval not displayed.    Lab Results   Component Value Date    CALCIUM 8.8 09/19/2018    PHOS 3.1 09/19/2018         Results from last 7 days  Lab Units 09/17/18  1353 09/16/18  0002 09/15/18  2328   BLOODCX   --  No growth at 5 days No growth at 5 days   MRSA SCREEN CX  No Methicillin Resistant Staphylococcus aureus isolated  --   --          Results from last 7 days  Lab Units 09/17/18  0728 09/16/18  1102 09/16/18  0617 09/15/18  2319   TROPONIN T ng/mL 0.097* 0.119* 0.112* 0.106*       Results Review:    I have reviewed the available laboratory results and reviewed the chest imaging personally    Imaging Results (all)     Procedure Component Value Units Date/Time    XR Chest 1 View [[id number]] Collected:  09/15/18 2337     Updated:  09/15/18 2341    Narrative:       PORTABLE CHEST X-RAY     HISTORY: soa     COMPARISON: None.     FINDINGS: Portable AP view of the chest was obtained. Patient is rotated  to the left. Lungs are under aerated but grossly  clear where  visualized.  There are calcified residua of granulomatous disease  present. Heart size and pulmonary vasculature are likely normal  considering  poor inspiration.   No obvious significant pleural fluid  collections. Extensive vascular calculi. Generalized osteopenia. Chronic  deformity of the proximal left humerus noted          Impression:          Poor inspiration without active disease identified,     This report was finalized on 9/15/2018 11:38 PM by Lei Gillis M.D.               ASSESSMENT:   Comfort measure  Sepsis; resolved  Suspected pna   Hypernatremia, severe  Acute renal failure  Severe dehydration  ? NSTEMI type 2  Acute metabolic encephalopathy   Advanced dementia  History of COPD  History of dysphagia    PLAN:  Ongoing palliative care. Pt appears comfortable. No family @ bedside.     I have discussed my findings and recommendations with patient, family and consulting provider.     Kasi Lindsay MD  9/21/2018

## 2018-09-21 NOTE — PLAN OF CARE
Problem: Skin Injury Risk (Adult)  Goal: Skin Health and Integrity  Outcome: Ongoing (interventions implemented as appropriate)      Problem: Fall Risk (Adult)  Goal: Absence of Fall  Outcome: Ongoing (interventions implemented as appropriate)      Problem: Patient Care Overview  Goal: Plan of Care Review  Outcome: Ongoing (interventions implemented as appropriate)   09/21/18 6702   Coping/Psychosocial   Plan of Care Reviewed With patient;daughter   Plan of Care Review   Progress declining   OTHER   Outcome Summary Pt appeared comfortable throughout the shift. Daughter at bedside for beginning of shift. Pt is confused, but still arousable. IV abx given per order. Will continue to monitor per comfort care.      Goal: Individualization and Mutuality  Outcome: Ongoing (interventions implemented as appropriate)    Goal: Discharge Needs Assessment  Outcome: Ongoing (interventions implemented as appropriate)    Goal: Interprofessional Rounds/Family Conf  Outcome: Ongoing (interventions implemented as appropriate)      Problem: Dying Patient, Actively (Adult)  Goal: Identify Related Risk Factors and Signs and Symptoms  Outcome: Ongoing (interventions implemented as appropriate)    Goal: Comfort/Pain Control  Outcome: Ongoing (interventions implemented as appropriate)    Goal: Peace/Preservation of Dignity During the Dying Process  Outcome: Ongoing (interventions implemented as appropriate)

## 2018-09-21 NOTE — PLAN OF CARE
Problem: Patient Care Overview  Goal: Plan of Care Review  Outcome: Ongoing (interventions implemented as appropriate)   09/20/18 2050   Coping/Psychosocial   Plan of Care Reviewed With patient   Plan of Care Review   Progress no change   OTHER   Outcome Summary Mediacted for anxiety and SOA x1. Will cont. to monitor and folllow current orders.       Problem: Dying Patient, Actively (Adult)  Goal: Identify Related Risk Factors and Signs and Symptoms  Outcome: Ongoing (interventions implemented as appropriate)    Goal: Comfort/Pain Control  Outcome: Ongoing (interventions implemented as appropriate)    Goal: Peace/Preservation of Dignity During the Dying Process  Outcome: Ongoing (interventions implemented as appropriate)

## 2018-09-22 NOTE — PROGRESS NOTES
[facility]     Mar/chart reviewed  F/u dementia.  Sleeping    Vital Sign Min/Max for last 24 hours  Temp  Min: 97.4 °F (36.3 °C)  Max: 97.7 °F (36.5 °C)   BP  Min: 152/80  Max: 152/80   Pulse  Min: 84  Max: 85   Resp  Min: 16  Max: 20   SpO2  Min: 94 %  Max: 98 %   Flow (L/min)  Min: 2  Max: 2   No Data Recorded     Appears ill, resting  Nl respiraotry effort  rrr    ASSESSMENT:            Comfort measure  Sepsis; resolved  Suspected pna   Hypernatremia, severe  Acute renal failure  Severe dehydration  ? NSTEMI type 2  Acute metabolic encephalopathy   Advanced dementia  History of COPD  History of dysphagia     PLAN:  Ongoing palliative care. Pt appears comfortable.  Will get hospice consult for possible inpatient hospice  D/w family at bedside

## 2018-09-22 NOTE — PLAN OF CARE
Problem: Patient Care Overview  Goal: Plan of Care Review  Outcome: Ongoing (interventions implemented as appropriate)   09/22/18 2700   Coping/Psychosocial   Plan of Care Reviewed With patient   Plan of Care Review   Progress declining   OTHER   Outcome Summary Vitals as charted, medicated x2 for comfort, daughter at bedside, will continue to montior.

## 2018-09-22 NOTE — PLAN OF CARE
Problem: Skin Injury Risk (Adult)  Goal: Skin Health and Integrity  Outcome: Ongoing (interventions implemented as appropriate)      Problem: Fall Risk (Adult)  Goal: Absence of Fall  Outcome: Ongoing (interventions implemented as appropriate)      Problem: Patient Care Overview  Goal: Plan of Care Review  Outcome: Ongoing (interventions implemented as appropriate)   09/21/18 1857 09/21/18 2219 09/22/18 0455   Coping/Psychosocial   Plan of Care Reviewed With --  patient;daughter --    Plan of Care Review   Progress declining --  --    OTHER   Outcome Summary --  --  Pt rested well with family at bedside. Medicated for comfort X1. Continue comfort care.      Goal: Individualization and Mutuality  Outcome: Ongoing (interventions implemented as appropriate)    Goal: Discharge Needs Assessment  Outcome: Ongoing (interventions implemented as appropriate)      Problem: Dying Patient, Actively (Adult)  Goal: Identify Related Risk Factors and Signs and Symptoms  Outcome: Ongoing (interventions implemented as appropriate)    Goal: Comfort/Pain Control  Outcome: Ongoing (interventions implemented as appropriate)    Goal: Peace/Preservation of Dignity During the Dying Process  Outcome: Ongoing (interventions implemented as appropriate)

## 2018-09-23 NOTE — PLAN OF CARE
Problem: Skin Injury Risk (Adult)  Goal: Skin Health and Integrity  Outcome: Ongoing (interventions implemented as appropriate)      Problem: Fall Risk (Adult)  Goal: Absence of Fall  Outcome: Ongoing (interventions implemented as appropriate)      Problem: Patient Care Overview  Goal: Plan of Care Review  Outcome: Ongoing (interventions implemented as appropriate)   09/22/18 2355 09/23/18 0541   Coping/Psychosocial   Plan of Care Reviewed With patient;daughter --    Plan of Care Review   Progress --  declining   OTHER   Outcome Summary --  Pt rested well with family at bedside. Medicated for comfort X1. Small BM tonight. Continue comfort care.      Goal: Discharge Needs Assessment  Outcome: Ongoing (interventions implemented as appropriate)      Problem: Dying Patient, Actively (Adult)  Goal: Identify Related Risk Factors and Signs and Symptoms  Outcome: Ongoing (interventions implemented as appropriate)    Goal: Comfort/Pain Control  Outcome: Ongoing (interventions implemented as appropriate)    Goal: Peace/Preservation of Dignity During the Dying Process  Outcome: Ongoing (interventions implemented as appropriate)

## 2018-09-23 NOTE — PROGRESS NOTES
[facility]      Mar/chart reviewed  F/u dementia.  Sleeping    Vital Sign Min/Max for last 24 hours  Temp  Min: 97.2 °F (36.2 °C)  Max: 98.7 °F (37.1 °C)   BP  Min: 135/78  Max: 177/98   Pulse  Min: 84  Max: 86   Resp  Min: 16  Max: 20   SpO2  Min: 93 %  Max: 99 %   Flow (L/min)  Min: 2  Max: 2   No Data Recorded     Appears ill, resting  Nl respiraotry effort  rrr     ASSESSMENT:            Comfort measure  Sepsis; resolved  Suspected pna   Hypernatremia, severe  Acute renal failure  Severe dehydration  ? NSTEMI type 2  Acute metabolic encephalopathy   Advanced dementia  History of COPD  History of dysphagia     PLAN:  Ongoing palliative care. Pt appears comfortable.  Will get hospice consult for possible inpatient hospice

## 2018-09-24 PROBLEM — Z51.5 ADMISSION FOR HOSPICE CARE: Status: ACTIVE | Noted: 2018-01-01

## 2018-09-24 PROBLEM — E83.39 HYPOPHOSPHATASIA: Status: RESOLVED | Noted: 2018-01-01 | Resolved: 2018-01-01

## 2018-09-24 PROBLEM — A41.9 SEPSIS (HCC): Status: RESOLVED | Noted: 2018-01-01 | Resolved: 2018-01-01

## 2018-09-24 NOTE — CONSULTS
Arrived on unit, reviewed records and faxed to R. Spoke with LUIZA Gibson to discuss patient's disease progression. Assessed patient at bedside and discussed [facility] services with patient's daughter at bedside. Informed her [facility] is working with the guardianship office to enroll patient is [facility] services. Spoke with D who stated patient is [facility] scattered bed appropriate with primary diagnosis of Sepsis ([id number]). Spoke with Dr. Feng Kingston who stated he would admit patient to [facility] scattered bed today. Spoke with ROXY Vora who obtained consents from Guardianship office and emailed them to me for review. Spoke with TIMOTHY Lewis who completed [facility] Face to face appointment who stated patient is eligible for hospice benefit and appropriate for [facility]. Updated LUIZA Gibson, Annelise RG and Beth RG and devante Brandon RN. Hospice team will follow up tomorrow.    Thanks for the referral and opportunity to care for this patient.    Dakota Ballesteros RN, BSN  Referral and admission coordinator  [phone]

## 2018-09-24 NOTE — DISCHARGE SUMMARY
[facility]    Admit date: 9/15/2018  Discharge date: 9/24/2018    Admission/discharge diagnosis:  Comfort measure  Sepsis; resolved  Suspected pna   Hypernatremia, severe  Acute renal failure  Severe dehydration  ? NSTEMI type 2  Acute metabolic encephalopathy   Advanced dementia  History of COPD  History of dysphagia    HPI: as per Dr. Spain  89-year-old female with advanced dementia was transferred from the nursing home due to altered mental status worsening with elevated sodium of 173.  Patient has very advanced dementia currently confused and no history can be taken from her.  The emergency room she was noted to be a DNR but due to persistent hypotension was admitted to the ICU.  Her daughter is at the bedside and wishes no ag no fever chills as such was reported.  gressive interventions and also does not wish a central line at this time.  There is no history of aspiration.    Hospital course:  Patient made comfort measures only.    Discharge medications:  See med rec    Dispo:  Hospice scatter bed

## 2018-09-24 NOTE — H&P
[facility]    CC: UTO    HPI:  89yo admitted for comfort care, see recent h and p    Past Medical History:   Diagnosis Date   • Abnormal posture    • Alzheimer disease    • Anxiety disorder    • Asthma    • Chronic pain    • Chronic pain syndrome    • Constipation    • COPD (chronic obstructive pulmonary disease) (CMS/[facility])    • Dementia    • Disease of thyroid gland    • Dysphagia, oropharyngeal phase    • GERD (gastroesophageal reflux disease)    • Heart failure (CMS/[facility])    • Hemorrhoids    • Hyperlipidemia    • Hypertension    • Hypokalemia    • Osteoporosis    • Pneumonia    • Spondylosis without myelopathy or radiculopathy, site unspecified    • Unspecified osteoarthritis, unspecified site      Social History     Social History   • Marital status:      Social History Main Topics   • Drug use: Unknown     Other Topics Concern   • Not on file     History reviewed. No pertinent family history.  MEDS: reviewed  ALL: bactrim  ROS: UTO    Vital Sign Min/Max for last 24 hours  Temp  Min: 97.8 °F (36.6 °C)  Max: 97.8 °F (36.6 °C)   BP  Min: 141/82  Max: 141/82   Pulse  Min: 82  Max: 82   Resp  Min: 18  Max: 18   SpO2  Min: 91 %  Max: 91 %   Flow (L/min)  Min: 2  Max: 2   No Data Recorded     Appears ill, arouses  Nl resp effort,  rrr  Soft, nt nt,   No c/c/e    A/P:  Comfort measure  Sepsis; resolved  Suspected pna   Hypernatremia, severe  Acute renal failure  Severe dehydration  ? NSTEMI type 2  Acute metabolic encephalopathy   Advanced dementia  History of COPD  History of dysphagia    PLAN:  Ongoing palliative care.

## 2018-09-24 NOTE — PROGRESS NOTES
Case Management Discharge Note    Final Note: Discharge/readmit [facility] 9/24/18    Destination - Selection Complete     Service Request Status Selected Specialties Address Phone Number Fax Number    [facility] Selected Hospice [address], [facility] [address] [phone]      Durable Medical Equipment     No service has been selected for the patient.      Dialysis/Infusion     No service has been selected for the patient.      Home Medical Care     No service has been selected for the patient.      Social Care     No service has been selected for the patient.             Final Discharge Disposition Code: 51 - hospice medical facility

## 2018-09-24 NOTE — PROGRESS NOTES
[facility]      Mar/chart reviewed  F/u dementia.  Sleeping    Vital Sign Min/Max for last 24 hours  Temp  Min: 97.8 °F (36.6 °C)  Max: 97.8 °F (36.6 °C)   BP  Min: 141/82  Max: 141/82   Pulse  Min: 82  Max: 82   Resp  Min: 18  Max: 18   SpO2  Min: 91 %  Max: 91 %   Flow (L/min)  Min: 2  Max: 2   No Data Recorded     Appears ill, resting  Nl respiraotry effort  rrr     ASSESSMENT:            Comfort measure  Sepsis; resolved  Suspected pna   Hypernatremia, severe  Acute renal failure  Severe dehydration  ? NSTEMI type 2  Acute metabolic encephalopathy   Advanced dementia  History of COPD  History of dysphagia     PLAN:  Ongoing palliative care. Pt appears comfortable.  Will get hospice consult for possible inpatient hospice

## 2018-09-24 NOTE — PLAN OF CARE
Problem: Skin Injury Risk (Adult)  Goal: Skin Health and Integrity  Outcome: Ongoing (interventions implemented as appropriate)      Problem: Fall Risk (Adult)  Goal: Absence of Fall  Outcome: Ongoing (interventions implemented as appropriate)      Problem: Patient Care Overview  Goal: Plan of Care Review  Outcome: Ongoing (interventions implemented as appropriate)   09/24/18 1612   Coping/Psychosocial   Plan of Care Reviewed With patient   OTHER   Outcome Summary patient been resting most of the day, not really responding, gets a little restless sometimes but controlled with medication X 2, Q2 turn, VSS, will continue to monitor      Goal: Individualization and Mutuality  Outcome: Ongoing (interventions implemented as appropriate)    Goal: Discharge Needs Assessment  Outcome: Ongoing (interventions implemented as appropriate)    Goal: Interprofessional Rounds/Family Conf  Outcome: Ongoing (interventions implemented as appropriate)      Problem: Dying Patient, Actively (Adult)  Goal: Identify Related Risk Factors and Signs and Symptoms  Outcome: Ongoing (interventions implemented as appropriate)    Goal: Comfort/Pain Control  Outcome: Ongoing (interventions implemented as appropriate)    Goal: Peace/Preservation of Dignity During the Dying Process  Outcome: Outcome(s) achieved Date Met: 09/24/18

## 2018-09-24 NOTE — PLAN OF CARE
Problem: Skin Injury Risk (Adult)  Goal: Skin Health and Integrity  Outcome: Ongoing (interventions implemented as appropriate)      Problem: Fall Risk (Adult)  Goal: Absence of Fall  Outcome: Ongoing (interventions implemented as appropriate)      Problem: Patient Care Overview  Goal: Plan of Care Review  Outcome: Ongoing (interventions implemented as appropriate)   09/23/18 0541 09/23/18 [id number]   Coping/Psychosocial   Plan of Care Reviewed With --  patient   Plan of Care Review   Progress declining --    OTHER   Outcome Summary Pt rested well with family at bedside. Medicated for comfort X1. Small BM tonight. Continue comfort care.  --      Goal: Individualization and Mutuality  Outcome: Ongoing (interventions implemented as appropriate)    Goal: Discharge Needs Assessment  Outcome: Ongoing (interventions implemented as appropriate)      Problem: Dying Patient, Actively (Adult)  Goal: Identify Related Risk Factors and Signs and Symptoms  Outcome: Ongoing (interventions implemented as appropriate)    Goal: Comfort/Pain Control  Outcome: Ongoing (interventions implemented as appropriate)    Goal: Peace/Preservation of Dignity During the Dying Process  Outcome: Ongoing (interventions implemented as appropriate)

## 2018-09-24 NOTE — PROGRESS NOTES
Continued Stay Note  [facility]     Patient Name: Kaykay Cedeno  MRN: [id number]  Today's Date: 9/24/2018    Admit Date: 9/15/2018          Discharge Plan     Row Name 09/24/18 1657       Plan    Plan [facility] scattered bed effective 9/24/18    Patient/Family in Agreement with Plan yes    Plan Comments Discussed case with Dakota/Bruce. States agreeable with [facility] services. Orders written per MD to discharge/readmit to [facility].              Discharge Codes    No documentation.       Expected Discharge Date and Time     Expected Discharge Date Expected Discharge Time    Sep 24, 2018             Annelise Hernandez RN

## 2018-10-15 NOTE — DISCHARGE SUMMARY
Admit date: 9/24/18  Discharge date: 9/26/18    Admission/discharge diagnosis  A/P:  Comfort measure  Sepsis; resolved  Suspected pna   Hypernatremia, severe  Acute renal failure  Severe dehydration  ? NSTEMI type 2  Acute metabolic encephalopathy   Advanced dementia  History of COPD  History of dysphagia     Hospital course:  Palliative care.

## 2022-07-06 NOTE — ED PROVIDER NOTES
" EMERGENCY DEPARTMENT ENCOUNTER    CHIEF COMPLAINT  Chief Complaint: AMS  History given by: NH  History limited by: AMS  Room Number: I374/1  PMD: Lars Ambriz MD      HPI:  Pt is a 89 y.o. female who presents with NH complaining of AMS. NH reports pt blood work drawn at 1500 today with \"critically high\" sodium (reported 173), CO2 (reported 30.5), and BUN/Cr (80/2.47). NH also reports pt 83% O2 saturation on RA. Pt is DNR and has hx dementia.    Duration:  Unknown onset  Onset: Gradual  Timing: Constant  Quality: AMS  Intensity/Severity: Moderate  Progression: Unchanged  Associated Symptoms: Critically high labs and low O2 saturation  Previous Episodes: None stated  Treatment before arrival: None    PAST MEDICAL HISTORY  Active Ambulatory Problems     Diagnosis Date Noted   • No Active Ambulatory Problems     Resolved Ambulatory Problems     Diagnosis Date Noted   • No Resolved Ambulatory Problems     Past Medical History:   Diagnosis Date   • Abnormal posture    • Alzheimer disease    • Anxiety disorder    • Asthma    • Chronic pain    • Chronic pain syndrome    • Constipation    • COPD (chronic obstructive pulmonary disease) (CMS/HCC)    • Dementia    • Disease of thyroid gland    • Dysphagia, oropharyngeal phase    • GERD (gastroesophageal reflux disease)    • Heart failure (CMS/HCC)    • Hemorrhoids    • Hyperlipidemia    • Hypertension    • Hypokalemia    • Osteoporosis    • Pneumonia    • Spondylosis without myelopathy or radiculopathy, site unspecified    • Unspecified osteoarthritis, unspecified site        PAST SURGICAL HISTORY  History reviewed. No pertinent surgical history.    FAMILY HISTORY  History reviewed. No pertinent family history.    SOCIAL HISTORY  Social History     Social History   • Marital status:      Spouse name: N/A   • Number of children: N/A   • Years of education: N/A     Occupational History   • Not on file.     Social History Main Topics   • Smoking status: Not on " file   • Smokeless tobacco: Not on file   • Alcohol use Not on file   • Drug use: Unknown   • Sexual activity: Not on file     Other Topics Concern   • Not on file     Social History Narrative   • No narrative on file       ALLERGIES  Bactrim [sulfamethoxazole-trimethoprim]    REVIEW OF SYSTEMS  Review of Systems   Unable to perform ROS: Mental status change (AMS)   Constitutional:        NH reports high sodium, CO2, and BUN/Cr. NH also reports pt 83% O2 saturation on RA.       PHYSICAL EXAM  ED Triage Vitals   Temp Pulse Resp BP SpO2   -- -- -- -- --      Temp src Heart Rate Source Patient Position BP Location FiO2 (%)   -- -- -- -- --       Physical Exam   Constitutional:   Pt is ill appearing.   HENT:   Head: Normocephalic and atraumatic.   Eyes: Pupils are equal, round, and reactive to light. EOM are normal.   Neck: Normal range of motion. Neck supple.   Cardiovascular: Regular rhythm and normal heart sounds.  Tachycardia present.    Pulmonary/Chest: She is in respiratory distress (mild). She has rhonchi (bibasilar).   Abdominal: Soft. There is no tenderness. There is no rebound and no guarding.   Musculoskeletal: Normal range of motion. She exhibits no edema.   Neurological:   Neuro exam obtainable secondary to baseline dementia.   Skin: Skin is warm and dry. No rash noted.   Psychiatric: Mood and affect normal.   Nursing note and vitals reviewed.      LAB RESULTS  Lab Results (last 24 hours)     Procedure Component Value Units Date/Time    CBC & Differential [[id number]] Collected:  09/15/18 1834    Specimen:  Blood Updated:  09/15/18 2056    Narrative:       The following orders were created for panel order CBC & Differential.  Procedure                               Abnormality         Status                     ---------                               -----------         ------                     Scan Slide[[id number]]                   Normal              Final result               CBC Auto  Differential[[id number]]        Abnormal            Final result                 Please view results for these tests on the individual orders.    Basic Metabolic Panel [[id number]]  (Abnormal) Collected:  09/15/18 1834    Specimen:  Blood Updated:  09/15/18 2041     Glucose 73 mg/dL      BUN 80 (H) mg/dL      Creatinine 2.47 (H) mg/dL      Sodium 173 (C) mmol/L      Potassium 4.5 mmol/L      Chloride 125 (H) mmol/L      CO2 30.5 (H) mmol/L      Calcium 8.4 (L) mg/dL      eGFR  African Amer 22 (L) mL/min/1.73      eGFR Non African Amer 18 (L) mL/min/1.73      BUN/Creatinine Ratio 32.4 (H)     Anion Gap 17.5 mmol/L     Narrative:       The MDRD GFR formula is only valid for adults with stable renal function between ages 18 and 70.    CBC Auto Differential [[id number]]  (Abnormal) Collected:  09/15/18 1834    Specimen:  Blood Updated:  09/15/18 2056     WBC 19.42 (H) 10*3/mm3      RBC 4.34 10*6/mm3      Hemoglobin 13.3 g/dL      Hematocrit 46.5 (H) %      .1 (H) fL      MCH 30.6 pg      MCHC 28.6 (L) g/dL      RDW 14.2 (H) %      RDW-SD 55.2 (H) fl      MPV 13.7 (H) fL      Platelets 163 10*3/mm3      Neutrophil % 83.1 (H) %      Lymphocyte % 9.7 (L) %      Monocyte % 6.6 %      Eosinophil % 0.1 (L) %      Basophil % 0.2 %      Immature Grans % 0.3 %      Neutrophils, Absolute 16.14 (H) 10*3/mm3      Lymphocytes, Absolute 1.89 10*3/mm3      Monocytes, Absolute 1.28 (H) 10*3/mm3      Eosinophils, Absolute 0.02 10*3/mm3      Basophils, Absolute 0.04 10*3/mm3      Immature Grans, Absolute 0.05 (H) 10*3/mm3     Scan Slide [[id number]]  (Normal) Collected:  09/15/18 1834    Specimen:  Blood Updated:  09/15/18 2056     RBC Morphology Normal     WBC Morphology Normal     Platelet Morphology Normal    CBC & Differential [[id number]] Collected:  09/15/18 2319    Specimen:  Blood Updated:  09/15/18 8392    Narrative:       The following orders were created for panel order CBC & Differential.  Procedure                                Abnormality         Status                     ---------                               -----------         ------                     Scan Slide[[id number]]                                       Final result               CBC Auto Differential[[id number]]        Abnormal            Final result                 Please view results for these tests on the individual orders.    Comprehensive Metabolic Panel [[id number]]  (Abnormal) Collected:  09/15/18 2319    Specimen:  Blood Updated:  09/16/18 0006     Glucose 84 mg/dL      BUN 85 (H) mg/dL      Creatinine 2.60 (H) mg/dL      Sodium 172 (C) mmol/L      Potassium 4.8 mmol/L      Chloride 129 (H) mmol/L      CO2 28.1 mmol/L      Calcium 8.3 (L) mg/dL      Total Protein 6.8 g/dL      Albumin 2.80 (L) g/dL      ALT (SGPT) 22 U/L      AST (SGOT) 26 U/L      Alkaline Phosphatase 74 U/L      Total Bilirubin 0.5 mg/dL      eGFR Non African Amer 17 (L) mL/min/1.73      Globulin 4.0 gm/dL      A/G Ratio 0.7 g/dL      BUN/Creatinine Ratio 32.7 (H)     Anion Gap 14.9 mmol/L     Narrative:       The MDRD GFR formula is only valid for adults with stable renal function between ages 18 and 70.    Protime-INR [[id number]]  (Abnormal) Collected:  09/15/18 2319    Specimen:  Blood Updated:  09/15/18 2348     Protime 21.1 (H) Seconds      INR 1.86 (H)    aPTT [[id number]]  (Abnormal) Collected:  09/15/18 2319    Specimen:  Blood Updated:  09/15/18 2348     PTT 38.8 (H) seconds     Troponin [[id number]]  (Abnormal) Collected:  09/15/18 2319    Specimen:  Blood Updated:  09/16/18 0012     Troponin T 0.106 (C) ng/mL     Narrative:       Troponin T Reference Ranges:  Less than 0.03 ng/mL:    Negative for AMI  0.03 to 0.09 ng/mL:      Indeterminant for AMI  Greater than 0.09 ng/mL: Positive for AMI    Procalcitonin [[id number]]  (Abnormal) Collected:  09/15/18 2319    Specimen:  Blood Updated:  09/16/18 0012     Procalcitonin 0.71 (C) ng/mL     Narrative:       As a Marker for Sepsis  "(Non-Neonates):   1. <0.5 ng/mL represents a low risk of severe sepsis and/or septic shock.  1. >2 ng/mL represents a high risk of severe sepsis and/or septic shock.    As a Marker for Lower Respiratory Tract Infections that require antibiotic therapy:  PCT on Admission     Antibiotic Therapy             6-12 Hrs later  > 0.5                Strongly Recommended            >0.25 - <0.5         Recommended  0.1 - 0.25           Discouraged                   Remeasure/reassess PCT  <0.1                 Strongly Discouraged          Remeasure/reassess PCT      As 28 day mortality risk marker: \"Change in Procalcitonin Result\" (> 80 % or <=80 %) if Day 0 (or Day 1) and Day 4 values are available. Refer to http://[email or web address] Mobilepct-[email or web address]/   Change in PCT <=80 %   A decrease of PCT levels below or equal to 80 % defines a positive change in PCT test result representing a higher risk for 28-day all-cause mortality of patients diagnosed with severe sepsis or septic shock.  Change in PCT > 80 %   A decrease of PCT levels of more than 80 % defines a negative change in PCT result representing a lower risk for 28-day all-cause mortality of patients diagnosed with severe sepsis or septic shock.                BNP [[id number]]  (Abnormal) Collected:  09/15/18 2319    Specimen:  Blood Updated:  09/16/18 0009     proBNP 4,442.0 (H) pg/mL     Narrative:       Among patients with dyspnea, NT-proBNP is highly sensitive for the detection of acute congestive heart failure. In addition NT-proBNP of <300 pg/ml effectively rules out acute congestive heart failure with 99% negative predictive value.    CBC Auto Differential [[id number]]  (Abnormal) Collected:  09/15/18 2319    Specimen:  Blood Updated:  09/15/18 2357     WBC 19.80 (H) 10*3/mm3      RBC 4.00 10*6/mm3      Hemoglobin 12.6 g/dL      Hematocrit 42.5 %      .3 (H) fL      MCH 31.5 pg      MCHC 29.6 (L) g/dL      RDW 14.3 (H) %      RDW-SD 54.9 (H) fl      MPV 13.8 (H) fL  "     Platelets 142 10*3/mm3      Neutrophil % 81.1 (H) %      Lymphocyte % 11.4 (L) %      Monocyte % 7.2 %      Eosinophil % 0.1 (L) %      Basophil % 0.2 %      Immature Grans % 0.5 %      Neutrophils, Absolute 16.08 (H) 10*3/mm3      Lymphocytes, Absolute 2.25 10*3/mm3      Monocytes, Absolute 1.42 (H) 10*3/mm3      Eosinophils, Absolute 0.01 10*3/mm3      Basophils, Absolute 0.04 10*3/mm3      Immature Grans, Absolute 0.09 (H) 10*3/mm3      nRBC 0.0 /100 WBC     Scan Slide [[id number]] Collected:  09/15/18 2319    Specimen:  Blood Updated:  09/15/18 2357    Blood Culture - Blood, [[id number]] Collected:  09/15/18 2328    Specimen:  Blood from Arm, Left Updated:  09/15/18 2334    Lactic Acid, Plasma [[id number]]  (Normal) Collected:  09/15/18 2328    Specimen:  Blood Updated:  09/15/18 2351     Lactate 2.0 mmol/L     Urinalysis With Microscopic If Indicated (No Culture) - Urine, Catheter [[id number]]  (Abnormal) Collected:  09/15/18 2333    Specimen:  Urine from Urine, Catheter Updated:  09/15/18 2355     Color, UA Dark Yellow (A)     Appearance, UA Cloudy (A)     pH, UA <=5.0     Specific Gravity, UA 1.021     Glucose, UA Negative     Ketones, UA Trace (A)     Bilirubin, UA Negative     Blood, UA Trace (A)     Protein, UA 30 mg/dL (1+) (A)     Leuk Esterase, UA Trace (A)     Nitrite, UA Negative     Urobilinogen, UA 1.0 E.U./dL    Urinalysis, Microscopic Only - Urine, Clean Catch [[id number]]  (Abnormal) Collected:  09/15/18 2333    Specimen:  Urine from Urine, Catheter Updated:  09/16/18 0014     RBC, UA 3-5 (A) /HPF      WBC, UA 0-2 /HPF      Bacteria, UA None Seen /HPF      Squamous Epithelial Cells, UA 3-6 (A) /HPF      Hyaline Casts, UA 13-20 /LPF      Fine Granular Casts, UA 7-12 /LPF      Methodology Manual Light Microscopy    Blood Culture - Blood, [[id number]] Collected:  09/16/18 0002    Specimen:  Blood from Arm, Left Updated:  09/16/18 0005    POC Glucose Once [[id number]]  (Normal) Collected:  09/16/18 0206     Specimen:  Blood Updated:  09/16/18 0208     Glucose 82 mg/dL     Narrative:       Meter: [id number] : [id number] Silvio ROCK RN    POC Glucose Once [[id number]]  (Normal) Collected:  09/16/18 0320    Specimen:  Blood Updated:  09/16/18 0321     Glucose 109 mg/dL     Narrative:       Meter: [id number] : [id number] Korey JOHNSON          I ordered the above labs and reviewed the results    RADIOLOGY  XR Chest 1 View   Final Result       Poor inspiration without active disease identified,       This report was finalized on 9/15/2018 11:38 PM by Lei Gillis M.D.               I ordered the above noted radiological studies. Interpreted by radiologist. Reviewed by me in PACS.       PROCEDURES  Critical Care  Performed by: KANDY KELLEY  Authorized by: KANDY KELLEY     Critical care provider statement:     Critical care time (minutes):  35    Critical care time was exclusive of:  Separately billable procedures and treating other patients    Critical care was necessary to treat or prevent imminent or life-threatening deterioration of the following conditions:  Sepsis    Critical care was time spent personally by me on the following activities:  Blood draw for specimens, development of treatment plan with patient or surrogate, discussions with consultants, evaluation of patient's response to treatment, examination of patient, obtaining history from patient or surrogate, ordering and performing treatments and interventions, ordering and review of laboratory studies, ordering and review of radiographic studies, pulse oximetry, re-evaluation of patient's condition and review of old charts    I assumed direction of critical care for this patient from another provider in my specialty: no            EKG           EKG time: 2323  Rhythm/Rate: Sinus tachycardia rate 110  P waves and MS: Nml P waves  QRS, axis: Nml QRS   ST and T waves: No acute ST and T wave changes     Interpreted  Contemporaneously by me, independently viewed  No prior for comparison.    PROGRESS AND CONSULTS   2321 Ordered EKG, CBC, BNP, Procalcitonin, lactic acid, blood cultures, UA, troponin, PTT, INR, and CXR for further evaluation. Ordered IVF for hydration and tylenol for treatment of fever.    0021 Rechecked with pt, who's family at bedside, and discussed that pt is severely dehydrated which has caused her hypernatremia. Plan to give pt IVF then admit. Pt family understands and agrees with the plan, all questions answered.    0032 Ordered vancomycin and cefepime for treatment of infection.    0036 Placed call to pulmonology for admission.    0113 Discussed pt with Dr. Spain, pulmonology, who agrees to admit.    MEDICAL DECISION MAKING  Results were reviewed/discussed with the patient and they were also made aware of online access. Pt also made aware that some labs, such as cultures, will not be resulted during ER visit and follow up with PMD is necessary.     MDM  Number of Diagnoses or Management Options  Acute renal failure, unspecified acute renal failure type (CMS/HCC):   Dehydration:   Hypernatremia:   Leukocytosis, unspecified type:   Sepsis, due to unspecified organism (CMS/HCC):   Septic shock (CMS/[facility]):      Amount and/or Complexity of Data Reviewed  Clinical lab tests: ordered and reviewed (UA shows no UTI, lactate= 2.0, procalcitonin= 0.71, proBNP= 4,442.0, troponin= 0.106, Sodium= 172, creatinine= 2.60, WBC= 19.80, PTT= 38.8, INR= 1.86)  Tests in the radiology section of CPT®: ordered and reviewed (CXR shows poor inspiration without active disease identified)  Tests in the medicine section of CPT®: ordered and reviewed (See procedure notes for EKG.)  Decide to obtain previous medical records or to obtain history from someone other than the patient: yes  Review and summarize past medical records: yes (There is multiple outpatient office visits, but no record in EPIC of ED visits.)    Patient  Progress  Patient progress: stable         DIAGNOSIS  Final diagnoses:   Hypernatremia   Acute renal failure, unspecified acute renal failure type (CMS/HCC)   Sepsis, due to unspecified organism (CMS/HCC)   Septic shock (CMS/HCC)   Leukocytosis, unspecified type   Dehydration       DISPOSITION  ADMISSION TO ICU    Discussed treatment plan and reason for admission with pt/family and admitting physician.  Pt/family voiced understanding of the plan for admission for further testing/treatment as needed.     Latest Documented Vital Signs:  As of 6:57 AM  BP- 106/63 HR- 90 Temp- 99.3 °F (37.4 °C) (Oral) O2 sat- 98%    --  Documentation assistance provided by mariella Randolph for Dr. Bryson.  Information recorded by the scribe was done at my direction and has been verified and validated by me.     Amada Randolph  09/16/18 0148       Jam Bryson MD  09/16/18 0669     normal...